# Patient Record
Sex: FEMALE | Race: AMERICAN INDIAN OR ALASKA NATIVE | Employment: UNEMPLOYED | URBAN - METROPOLITAN AREA
[De-identification: names, ages, dates, MRNs, and addresses within clinical notes are randomized per-mention and may not be internally consistent; named-entity substitution may affect disease eponyms.]

---

## 2021-04-25 ENCOUNTER — APPOINTMENT (OUTPATIENT)
Dept: CT IMAGING | Age: 71
DRG: 065 | End: 2021-04-25
Payer: MEDICARE

## 2021-04-25 ENCOUNTER — APPOINTMENT (OUTPATIENT)
Dept: GENERAL RADIOLOGY | Age: 71
DRG: 065 | End: 2021-04-25
Payer: MEDICARE

## 2021-04-25 ENCOUNTER — HOSPITAL ENCOUNTER (INPATIENT)
Age: 71
LOS: 3 days | Discharge: NURSING, REHAB OR LONG-TERM CARE | DRG: 065 | End: 2021-04-29
Attending: EMERGENCY MEDICINE | Admitting: INTERNAL MEDICINE
Payer: MEDICARE

## 2021-04-25 DIAGNOSIS — I63.9 ACUTE CVA (CEREBROVASCULAR ACCIDENT) (HCC): ICD-10-CM

## 2021-04-25 DIAGNOSIS — R11.2 NON-INTRACTABLE VOMITING WITH NAUSEA, UNSPECIFIED VOMITING TYPE: Primary | ICD-10-CM

## 2021-04-25 DIAGNOSIS — J32.0 CHRONIC MAXILLARY SINUSITIS: ICD-10-CM

## 2021-04-25 PROBLEM — R42 DIZZINESS: Status: ACTIVE | Noted: 2021-04-25

## 2021-04-25 LAB
-: ABNORMAL
ABSOLUTE EOS #: 0.4 K/UL (ref 0–0.4)
ABSOLUTE IMMATURE GRANULOCYTE: ABNORMAL K/UL (ref 0–0.3)
ABSOLUTE LYMPH #: 3.3 K/UL (ref 1–4.8)
ABSOLUTE MONO #: 0.5 K/UL (ref 0.1–1.2)
ALBUMIN SERPL-MCNC: 3.3 G/DL (ref 3.5–5.2)
ALBUMIN/GLOBULIN RATIO: 1.1 (ref 1–2.5)
ALP BLD-CCNC: 65 U/L (ref 35–104)
ALT SERPL-CCNC: 12 U/L (ref 5–33)
AMORPHOUS: ABNORMAL
AMYLASE: 70 U/L (ref 28–100)
ANION GAP SERPL CALCULATED.3IONS-SCNC: 12 MMOL/L (ref 9–17)
AST SERPL-CCNC: 16 U/L
BACTERIA: ABNORMAL
BASOPHILS # BLD: 1 % (ref 0–2)
BASOPHILS ABSOLUTE: 0.1 K/UL (ref 0–0.2)
BILIRUB SERPL-MCNC: 0.4 MG/DL (ref 0.3–1.2)
BILIRUBIN URINE: NEGATIVE
BNP INTERPRETATION: NORMAL
BUN BLDV-MCNC: 17 MG/DL (ref 8–23)
BUN/CREAT BLD: ABNORMAL (ref 9–20)
C-REACTIVE PROTEIN: <3 MG/L (ref 0–5)
CALCIUM SERPL-MCNC: 9.3 MG/DL (ref 8.6–10.4)
CASTS UA: ABNORMAL /LPF
CASTS UA: ABNORMAL /LPF
CHLORIDE BLD-SCNC: 102 MMOL/L (ref 98–107)
CO2: 25 MMOL/L (ref 20–31)
COLOR: YELLOW
COMMENT UA: ABNORMAL
CREAT SERPL-MCNC: 0.58 MG/DL (ref 0.5–0.9)
CRYSTALS, UA: ABNORMAL /HPF
D-DIMER QUANTITATIVE: 0.42 MG/L FEU
DIFFERENTIAL TYPE: ABNORMAL
EOSINOPHILS RELATIVE PERCENT: 5 % (ref 1–4)
EPITHELIAL CELLS UA: ABNORMAL /HPF (ref 0–5)
GFR AFRICAN AMERICAN: >60 ML/MIN
GFR NON-AFRICAN AMERICAN: >60 ML/MIN
GFR SERPL CREATININE-BSD FRML MDRD: ABNORMAL ML/MIN/{1.73_M2}
GFR SERPL CREATININE-BSD FRML MDRD: ABNORMAL ML/MIN/{1.73_M2}
GLUCOSE BLD-MCNC: 134 MG/DL (ref 65–105)
GLUCOSE BLD-MCNC: 173 MG/DL (ref 70–99)
GLUCOSE URINE: NEGATIVE
HCT VFR BLD CALC: 45.7 % (ref 36–46)
HEMOGLOBIN: 14.9 G/DL (ref 12–16)
IMMATURE GRANULOCYTES: ABNORMAL %
KETONES, URINE: NEGATIVE
LACTIC ACID, SEPSIS WHOLE BLOOD: ABNORMAL MMOL/L (ref 0.5–1.9)
LACTIC ACID, SEPSIS WHOLE BLOOD: NORMAL MMOL/L (ref 0.5–1.9)
LACTIC ACID, SEPSIS: 1.5 MMOL/L (ref 0.5–1.9)
LACTIC ACID, SEPSIS: 2.3 MMOL/L (ref 0.5–1.9)
LEUKOCYTE ESTERASE, URINE: NEGATIVE
LIPASE: 42 U/L (ref 13–60)
LYMPHOCYTES # BLD: 39 % (ref 24–44)
MCH RBC QN AUTO: 28.7 PG (ref 26–34)
MCHC RBC AUTO-ENTMCNC: 32.6 G/DL (ref 31–37)
MCV RBC AUTO: 88 FL (ref 80–100)
MONOCYTES # BLD: 6 % (ref 2–11)
MUCUS: ABNORMAL
NITRITE, URINE: NEGATIVE
NRBC AUTOMATED: ABNORMAL PER 100 WBC
OTHER OBSERVATIONS UA: ABNORMAL
PDW BLD-RTO: 13.3 % (ref 12.5–15.4)
PH UA: 7 (ref 5–8)
PLATELET # BLD: 256 K/UL (ref 140–450)
PLATELET ESTIMATE: ABNORMAL
PMV BLD AUTO: 9.6 FL (ref 6–12)
POTASSIUM SERPL-SCNC: 3.7 MMOL/L (ref 3.7–5.3)
PRO-BNP: 33 PG/ML
PROTEIN UA: ABNORMAL
RBC # BLD: 5.2 M/UL (ref 4–5.2)
RBC # BLD: ABNORMAL 10*6/UL
RBC UA: ABNORMAL /HPF (ref 0–2)
RENAL EPITHELIAL, UA: ABNORMAL /HPF
SARS-COV-2, RAPID: NOT DETECTED
SEDIMENTATION RATE, ERYTHROCYTE: 35 MM (ref 0–30)
SEG NEUTROPHILS: 49 % (ref 36–66)
SEGMENTED NEUTROPHILS ABSOLUTE COUNT: 4.2 K/UL (ref 1.8–7.7)
SODIUM BLD-SCNC: 139 MMOL/L (ref 135–144)
SPECIFIC GRAVITY UA: 1.02 (ref 1–1.03)
SPECIMEN DESCRIPTION: NORMAL
TOTAL PROTEIN: 6.4 G/DL (ref 6.4–8.3)
TRICHOMONAS: ABNORMAL
TROPONIN INTERP: NORMAL
TROPONIN INTERP: NORMAL
TROPONIN T: NORMAL NG/ML
TROPONIN T: NORMAL NG/ML
TROPONIN, HIGH SENSITIVITY: 6 NG/L (ref 0–14)
TROPONIN, HIGH SENSITIVITY: <6 NG/L (ref 0–14)
TURBIDITY: CLEAR
URINE HGB: ABNORMAL
UROBILINOGEN, URINE: NORMAL
WBC # BLD: 8.4 K/UL (ref 3.5–11)
WBC # BLD: ABNORMAL 10*3/UL
WBC UA: ABNORMAL /HPF (ref 0–5)
YEAST: ABNORMAL

## 2021-04-25 PROCEDURE — 83605 ASSAY OF LACTIC ACID: CPT

## 2021-04-25 PROCEDURE — 2580000003 HC RX 258: Performed by: EMERGENCY MEDICINE

## 2021-04-25 PROCEDURE — 86140 C-REACTIVE PROTEIN: CPT

## 2021-04-25 PROCEDURE — 99285 EMERGENCY DEPT VISIT HI MDM: CPT

## 2021-04-25 PROCEDURE — 70450 CT HEAD/BRAIN W/O DYE: CPT

## 2021-04-25 PROCEDURE — 85025 COMPLETE CBC W/AUTO DIFF WBC: CPT

## 2021-04-25 PROCEDURE — 96365 THER/PROPH/DIAG IV INF INIT: CPT

## 2021-04-25 PROCEDURE — 84484 ASSAY OF TROPONIN QUANT: CPT

## 2021-04-25 PROCEDURE — 87635 SARS-COV-2 COVID-19 AMP PRB: CPT

## 2021-04-25 PROCEDURE — 6360000002 HC RX W HCPCS

## 2021-04-25 PROCEDURE — 6360000002 HC RX W HCPCS: Performed by: EMERGENCY MEDICINE

## 2021-04-25 PROCEDURE — 36415 COLL VENOUS BLD VENIPUNCTURE: CPT

## 2021-04-25 PROCEDURE — 71045 X-RAY EXAM CHEST 1 VIEW: CPT

## 2021-04-25 PROCEDURE — 85652 RBC SED RATE AUTOMATED: CPT

## 2021-04-25 PROCEDURE — 85379 FIBRIN DEGRADATION QUANT: CPT

## 2021-04-25 PROCEDURE — 83690 ASSAY OF LIPASE: CPT

## 2021-04-25 PROCEDURE — 70496 CT ANGIOGRAPHY HEAD: CPT

## 2021-04-25 PROCEDURE — 80053 COMPREHEN METABOLIC PANEL: CPT

## 2021-04-25 PROCEDURE — 81001 URINALYSIS AUTO W/SCOPE: CPT

## 2021-04-25 PROCEDURE — 82150 ASSAY OF AMYLASE: CPT

## 2021-04-25 PROCEDURE — 82947 ASSAY GLUCOSE BLOOD QUANT: CPT

## 2021-04-25 PROCEDURE — 83880 ASSAY OF NATRIURETIC PEPTIDE: CPT

## 2021-04-25 PROCEDURE — 6360000004 HC RX CONTRAST MEDICATION: Performed by: EMERGENCY MEDICINE

## 2021-04-25 PROCEDURE — 96375 TX/PRO/DX INJ NEW DRUG ADDON: CPT

## 2021-04-25 PROCEDURE — 93005 ELECTROCARDIOGRAM TRACING: CPT | Performed by: EMERGENCY MEDICINE

## 2021-04-25 RX ORDER — ONDANSETRON 2 MG/ML
4 INJECTION INTRAMUSCULAR; INTRAVENOUS ONCE
Status: COMPLETED | OUTPATIENT
Start: 2021-04-25 | End: 2021-04-25

## 2021-04-25 RX ORDER — 0.9 % SODIUM CHLORIDE 0.9 %
100 INTRAVENOUS SOLUTION INTRAVENOUS ONCE
Status: COMPLETED | OUTPATIENT
Start: 2021-04-25 | End: 2021-04-25

## 2021-04-25 RX ORDER — ONDANSETRON 2 MG/ML
INJECTION INTRAMUSCULAR; INTRAVENOUS
Status: COMPLETED
Start: 2021-04-25 | End: 2021-04-25

## 2021-04-25 RX ORDER — SODIUM CHLORIDE 9 MG/ML
1000 INJECTION, SOLUTION INTRAVENOUS CONTINUOUS
Status: DISCONTINUED | OUTPATIENT
Start: 2021-04-25 | End: 2021-04-26

## 2021-04-25 RX ORDER — SODIUM CHLORIDE 0.9 % (FLUSH) 0.9 %
10 SYRINGE (ML) INJECTION PRN
Status: DISCONTINUED | OUTPATIENT
Start: 2021-04-25 | End: 2021-04-29 | Stop reason: HOSPADM

## 2021-04-25 RX ORDER — 0.9 % SODIUM CHLORIDE 0.9 %
30 INTRAVENOUS SOLUTION INTRAVENOUS ONCE
Status: COMPLETED | OUTPATIENT
Start: 2021-04-25 | End: 2021-04-25

## 2021-04-25 RX ADMIN — SODIUM CHLORIDE 1000 ML: 9 INJECTION, SOLUTION INTRAVENOUS at 20:52

## 2021-04-25 RX ADMIN — ONDANSETRON 4 MG: 2 INJECTION INTRAMUSCULAR; INTRAVENOUS at 21:40

## 2021-04-25 RX ADMIN — SODIUM CHLORIDE 1000 ML: 9 INJECTION, SOLUTION INTRAVENOUS at 21:10

## 2021-04-25 RX ADMIN — SODIUM CHLORIDE, PRESERVATIVE FREE 10 ML: 5 INJECTION INTRAVENOUS at 22:31

## 2021-04-25 RX ADMIN — SODIUM CHLORIDE 100 ML: 9 INJECTION, SOLUTION INTRAVENOUS at 22:30

## 2021-04-25 RX ADMIN — ONDANSETRON 4 MG: 2 INJECTION INTRAMUSCULAR; INTRAVENOUS at 22:49

## 2021-04-25 RX ADMIN — IOPAMIDOL 75 ML: 755 INJECTION, SOLUTION INTRAVENOUS at 22:31

## 2021-04-25 SDOH — HEALTH STABILITY: MENTAL HEALTH: HOW OFTEN DO YOU HAVE A DRINK CONTAINING ALCOHOL?: NEVER

## 2021-04-25 ASSESSMENT — ENCOUNTER SYMPTOMS
COLOR CHANGE: 0
SHORTNESS OF BREATH: 0
VOMITING: 1
EYE DISCHARGE: 0
EYE REDNESS: 0
CONSTIPATION: 0
EYE PAIN: 0
DIARRHEA: 0
WHEEZING: 0
STRIDOR: 0
ABDOMINAL PAIN: 0
COUGH: 0
SORE THROAT: 0
NAUSEA: 1

## 2021-04-26 ENCOUNTER — APPOINTMENT (OUTPATIENT)
Dept: GENERAL RADIOLOGY | Age: 71
DRG: 065 | End: 2021-04-26
Payer: MEDICARE

## 2021-04-26 ENCOUNTER — APPOINTMENT (OUTPATIENT)
Dept: MRI IMAGING | Age: 71
DRG: 065 | End: 2021-04-26
Payer: MEDICARE

## 2021-04-26 PROBLEM — G43.009 MIGRAINE WITHOUT AURA AND WITHOUT STATUS MIGRAINOSUS, NOT INTRACTABLE: Status: ACTIVE | Noted: 2020-12-23

## 2021-04-26 PROBLEM — G47.30 SLEEP APNEA: Chronic | Status: ACTIVE | Noted: 2020-12-23

## 2021-04-26 PROBLEM — E78.00 HYPERCHOLESTEROLEMIA: Status: ACTIVE | Noted: 2021-04-26

## 2021-04-26 PROBLEM — I10 HYPERTENSION: Status: ACTIVE | Noted: 2020-12-23

## 2021-04-26 PROBLEM — I63.9 ACUTE CVA (CEREBROVASCULAR ACCIDENT) (HCC): Status: ACTIVE | Noted: 2021-04-26

## 2021-04-26 PROBLEM — M19.90 OSTEOARTHRITIS: Status: ACTIVE | Noted: 2021-04-26

## 2021-04-26 PROBLEM — G43.009 MIGRAINE WITHOUT AURA AND WITHOUT STATUS MIGRAINOSUS, NOT INTRACTABLE: Chronic | Status: ACTIVE | Noted: 2020-12-23

## 2021-04-26 PROBLEM — G47.30 SLEEP APNEA: Status: ACTIVE | Noted: 2020-12-23

## 2021-04-26 PROBLEM — M19.90 OSTEOARTHRITIS: Chronic | Status: ACTIVE | Noted: 2021-04-26

## 2021-04-26 LAB
CHOLESTEROL/HDL RATIO: 3.9
CHOLESTEROL: 258 MG/DL
FOLATE: 9 NG/ML
GLUCOSE BLD-MCNC: 120 MG/DL (ref 65–105)
HCT VFR BLD CALC: 40.9 % (ref 36–46)
HDLC SERPL-MCNC: 67 MG/DL
HEMOGLOBIN: 13.1 G/DL (ref 12–16)
LDL CHOLESTEROL: 162 MG/DL (ref 0–130)
MCH RBC QN AUTO: 28.5 PG (ref 26–34)
MCHC RBC AUTO-ENTMCNC: 32 G/DL (ref 31–37)
MCV RBC AUTO: 88.9 FL (ref 80–100)
NRBC AUTOMATED: NORMAL PER 100 WBC
PDW BLD-RTO: 13.5 % (ref 12.5–15.4)
PLATELET # BLD: 240 K/UL (ref 140–450)
PMV BLD AUTO: 9.5 FL (ref 6–12)
RBC # BLD: 4.6 M/UL (ref 4–5.2)
TRIGL SERPL-MCNC: 143 MG/DL
TROPONIN INTERP: NORMAL
TROPONIN INTERP: NORMAL
TROPONIN T: NORMAL NG/ML
TROPONIN T: NORMAL NG/ML
TROPONIN, HIGH SENSITIVITY: 6 NG/L (ref 0–14)
TROPONIN, HIGH SENSITIVITY: <6 NG/L (ref 0–14)
TSH SERPL DL<=0.05 MIU/L-ACNC: 1.45 MIU/L (ref 0.3–5)
VITAMIN B-12: 341 PG/ML (ref 232–1245)
VITAMIN D 25-HYDROXY: 14.9 NG/ML (ref 30–100)
VLDLC SERPL CALC-MCNC: ABNORMAL MG/DL (ref 1–30)
WBC # BLD: 8.8 K/UL (ref 3.5–11)

## 2021-04-26 PROCEDURE — 82306 VITAMIN D 25 HYDROXY: CPT

## 2021-04-26 PROCEDURE — 97166 OT EVAL MOD COMPLEX 45 MIN: CPT

## 2021-04-26 PROCEDURE — 99223 1ST HOSP IP/OBS HIGH 75: CPT | Performed by: INTERNAL MEDICINE

## 2021-04-26 PROCEDURE — 6360000002 HC RX W HCPCS: Performed by: NURSE PRACTITIONER

## 2021-04-26 PROCEDURE — 82947 ASSAY GLUCOSE BLOOD QUANT: CPT

## 2021-04-26 PROCEDURE — 97162 PT EVAL MOD COMPLEX 30 MIN: CPT

## 2021-04-26 PROCEDURE — 2580000003 HC RX 258: Performed by: NURSE PRACTITIONER

## 2021-04-26 PROCEDURE — 84484 ASSAY OF TROPONIN QUANT: CPT

## 2021-04-26 PROCEDURE — 85027 COMPLETE CBC AUTOMATED: CPT

## 2021-04-26 PROCEDURE — 70551 MRI BRAIN STEM W/O DYE: CPT

## 2021-04-26 PROCEDURE — 82746 ASSAY OF FOLIC ACID SERUM: CPT

## 2021-04-26 PROCEDURE — 82607 VITAMIN B-12: CPT

## 2021-04-26 PROCEDURE — 2700000000 HC OXYGEN THERAPY PER DAY

## 2021-04-26 PROCEDURE — 97116 GAIT TRAINING THERAPY: CPT

## 2021-04-26 PROCEDURE — 80061 LIPID PANEL: CPT

## 2021-04-26 PROCEDURE — 2580000003 HC RX 258: Performed by: EMERGENCY MEDICINE

## 2021-04-26 PROCEDURE — 36415 COLL VENOUS BLD VENIPUNCTURE: CPT

## 2021-04-26 PROCEDURE — 6370000000 HC RX 637 (ALT 250 FOR IP): Performed by: NURSE PRACTITIONER

## 2021-04-26 PROCEDURE — 74018 RADEX ABDOMEN 1 VIEW: CPT

## 2021-04-26 PROCEDURE — 99222 1ST HOSP IP/OBS MODERATE 55: CPT | Performed by: PSYCHIATRY & NEUROLOGY

## 2021-04-26 PROCEDURE — 97530 THERAPEUTIC ACTIVITIES: CPT

## 2021-04-26 PROCEDURE — 2500000003 HC RX 250 WO HCPCS: Performed by: NURSE PRACTITIONER

## 2021-04-26 PROCEDURE — 1210000000 HC MED SURG R&B

## 2021-04-26 PROCEDURE — APPSS45 APP SPLIT SHARED TIME 31-45 MINUTES: Performed by: NURSE PRACTITIONER

## 2021-04-26 PROCEDURE — 84443 ASSAY THYROID STIM HORMONE: CPT

## 2021-04-26 PROCEDURE — 97535 SELF CARE MNGMENT TRAINING: CPT

## 2021-04-26 PROCEDURE — 6360000002 HC RX W HCPCS: Performed by: EMERGENCY MEDICINE

## 2021-04-26 PROCEDURE — 94761 N-INVAS EAR/PLS OXIMETRY MLT: CPT

## 2021-04-26 RX ORDER — SODIUM CHLORIDE 0.9 % (FLUSH) 0.9 %
5-40 SYRINGE (ML) INJECTION EVERY 12 HOURS SCHEDULED
Status: DISCONTINUED | OUTPATIENT
Start: 2021-04-26 | End: 2021-04-29 | Stop reason: HOSPADM

## 2021-04-26 RX ORDER — DEXTROSE MONOHYDRATE 25 G/50ML
12.5 INJECTION, SOLUTION INTRAVENOUS PRN
Status: DISCONTINUED | OUTPATIENT
Start: 2021-04-26 | End: 2021-04-29 | Stop reason: HOSPADM

## 2021-04-26 RX ORDER — ACETAMINOPHEN 650 MG/1
650 SUPPOSITORY RECTAL EVERY 6 HOURS PRN
Status: DISCONTINUED | OUTPATIENT
Start: 2021-04-26 | End: 2021-04-29 | Stop reason: HOSPADM

## 2021-04-26 RX ORDER — MECLIZINE HCL 12.5 MG/1
25 TABLET ORAL EVERY 6 HOURS SCHEDULED
Status: DISCONTINUED | OUTPATIENT
Start: 2021-04-26 | End: 2021-04-26

## 2021-04-26 RX ORDER — POLYETHYLENE GLYCOL 3350 17 G/17G
17 POWDER, FOR SOLUTION ORAL DAILY PRN
Status: DISCONTINUED | OUTPATIENT
Start: 2021-04-26 | End: 2021-04-29 | Stop reason: HOSPADM

## 2021-04-26 RX ORDER — MECLIZINE HCL 12.5 MG/1
25 TABLET ORAL 3 TIMES DAILY PRN
Status: DISCONTINUED | OUTPATIENT
Start: 2021-04-26 | End: 2021-04-26

## 2021-04-26 RX ORDER — NICOTINE POLACRILEX 4 MG
15 LOZENGE BUCCAL PRN
Status: DISCONTINUED | OUTPATIENT
Start: 2021-04-26 | End: 2021-04-29 | Stop reason: HOSPADM

## 2021-04-26 RX ORDER — PROMETHAZINE HYDROCHLORIDE 25 MG/ML
12.5 INJECTION, SOLUTION INTRAMUSCULAR; INTRAVENOUS ONCE
Status: COMPLETED | OUTPATIENT
Start: 2021-04-26 | End: 2021-04-26

## 2021-04-26 RX ORDER — ASPIRIN 81 MG/1
81 TABLET, CHEWABLE ORAL DAILY
Status: DISCONTINUED | OUTPATIENT
Start: 2021-04-26 | End: 2021-04-29 | Stop reason: HOSPADM

## 2021-04-26 RX ORDER — SODIUM CHLORIDE 9 MG/ML
25 INJECTION, SOLUTION INTRAVENOUS PRN
Status: DISCONTINUED | OUTPATIENT
Start: 2021-04-26 | End: 2021-04-29 | Stop reason: HOSPADM

## 2021-04-26 RX ORDER — DEXTROSE MONOHYDRATE 50 MG/ML
100 INJECTION, SOLUTION INTRAVENOUS PRN
Status: DISCONTINUED | OUTPATIENT
Start: 2021-04-26 | End: 2021-04-29 | Stop reason: HOSPADM

## 2021-04-26 RX ORDER — METOPROLOL TARTRATE 5 MG/5ML
2.5 INJECTION INTRAVENOUS 4 TIMES DAILY PRN
Status: DISCONTINUED | OUTPATIENT
Start: 2021-04-26 | End: 2021-04-29 | Stop reason: HOSPADM

## 2021-04-26 RX ORDER — PROMETHAZINE HYDROCHLORIDE 25 MG/ML
25 INJECTION, SOLUTION INTRAMUSCULAR; INTRAVENOUS EVERY 6 HOURS PRN
Status: DISCONTINUED | OUTPATIENT
Start: 2021-04-26 | End: 2021-04-26

## 2021-04-26 RX ORDER — METOPROLOL TARTRATE 5 MG/5ML
2.5 INJECTION INTRAVENOUS EVERY 6 HOURS
Status: DISCONTINUED | OUTPATIENT
Start: 2021-04-26 | End: 2021-04-26

## 2021-04-26 RX ORDER — ATORVASTATIN CALCIUM 10 MG/1
20 TABLET, FILM COATED ORAL NIGHTLY
Status: DISCONTINUED | OUTPATIENT
Start: 2021-04-26 | End: 2021-04-29 | Stop reason: HOSPADM

## 2021-04-26 RX ORDER — SODIUM CHLORIDE 0.9 % (FLUSH) 0.9 %
5-40 SYRINGE (ML) INJECTION PRN
Status: DISCONTINUED | OUTPATIENT
Start: 2021-04-26 | End: 2021-04-29 | Stop reason: HOSPADM

## 2021-04-26 RX ORDER — PROMETHAZINE HYDROCHLORIDE 25 MG/1
12.5 TABLET ORAL EVERY 6 HOURS PRN
Status: DISCONTINUED | OUTPATIENT
Start: 2021-04-26 | End: 2021-04-29 | Stop reason: HOSPADM

## 2021-04-26 RX ORDER — METOCLOPRAMIDE HYDROCHLORIDE 5 MG/ML
10 INJECTION INTRAMUSCULAR; INTRAVENOUS EVERY 6 HOURS
Status: DISCONTINUED | OUTPATIENT
Start: 2021-04-26 | End: 2021-04-29

## 2021-04-26 RX ORDER — MECLIZINE HCL 12.5 MG/1
25 TABLET ORAL 3 TIMES DAILY PRN
Status: DISCONTINUED | OUTPATIENT
Start: 2021-04-26 | End: 2021-04-29 | Stop reason: HOSPADM

## 2021-04-26 RX ORDER — ATORVASTATIN CALCIUM 40 MG/1
40 TABLET, FILM COATED ORAL NIGHTLY
Status: DISCONTINUED | OUTPATIENT
Start: 2021-04-26 | End: 2021-04-26

## 2021-04-26 RX ORDER — METOPROLOL TARTRATE 5 MG/5ML
5 INJECTION INTRAVENOUS EVERY 4 HOURS PRN
Status: DISCONTINUED | OUTPATIENT
Start: 2021-04-26 | End: 2021-04-26

## 2021-04-26 RX ORDER — ONDANSETRON 2 MG/ML
4 INJECTION INTRAMUSCULAR; INTRAVENOUS EVERY 6 HOURS PRN
Status: DISCONTINUED | OUTPATIENT
Start: 2021-04-26 | End: 2021-04-29 | Stop reason: HOSPADM

## 2021-04-26 RX ORDER — CLOPIDOGREL BISULFATE 75 MG/1
75 TABLET ORAL DAILY
Status: DISCONTINUED | OUTPATIENT
Start: 2021-04-26 | End: 2021-04-29 | Stop reason: HOSPADM

## 2021-04-26 RX ORDER — ACETAMINOPHEN 325 MG/1
650 TABLET ORAL EVERY 6 HOURS PRN
Status: DISCONTINUED | OUTPATIENT
Start: 2021-04-26 | End: 2021-04-29 | Stop reason: HOSPADM

## 2021-04-26 RX ORDER — SODIUM CHLORIDE 9 MG/ML
INJECTION, SOLUTION INTRAVENOUS CONTINUOUS
Status: DISCONTINUED | OUTPATIENT
Start: 2021-04-26 | End: 2021-04-28

## 2021-04-26 RX ORDER — PROCHLORPERAZINE EDISYLATE 5 MG/ML
10 INJECTION INTRAMUSCULAR; INTRAVENOUS EVERY 6 HOURS PRN
Status: DISCONTINUED | OUTPATIENT
Start: 2021-04-26 | End: 2021-04-29 | Stop reason: HOSPADM

## 2021-04-26 RX ADMIN — ASPIRIN 81 MG: 81 TABLET, CHEWABLE ORAL at 12:08

## 2021-04-26 RX ADMIN — SODIUM CHLORIDE: 9 INJECTION, SOLUTION INTRAVENOUS at 09:00

## 2021-04-26 RX ADMIN — PROCHLORPERAZINE EDISYLATE 10 MG: 5 INJECTION INTRAMUSCULAR; INTRAVENOUS at 15:12

## 2021-04-26 RX ADMIN — PROMETHAZINE HYDROCHLORIDE 12.5 MG: 25 INJECTION INTRAMUSCULAR; INTRAVENOUS at 10:11

## 2021-04-26 RX ADMIN — METOCLOPRAMIDE 10 MG: 5 INJECTION, SOLUTION INTRAMUSCULAR; INTRAVENOUS at 21:05

## 2021-04-26 RX ADMIN — CEFTRIAXONE SODIUM 1000 MG: 1 INJECTION, POWDER, FOR SOLUTION INTRAMUSCULAR; INTRAVENOUS at 00:10

## 2021-04-26 RX ADMIN — ONDANSETRON 4 MG: 2 INJECTION INTRAMUSCULAR; INTRAVENOUS at 09:25

## 2021-04-26 RX ADMIN — PROMETHAZINE HYDROCHLORIDE 12.5 MG: 25 INJECTION INTRAMUSCULAR; INTRAVENOUS at 12:43

## 2021-04-26 RX ADMIN — ENOXAPARIN SODIUM 40 MG: 40 INJECTION SUBCUTANEOUS at 09:25

## 2021-04-26 RX ADMIN — ONDANSETRON 4 MG: 2 INJECTION INTRAMUSCULAR; INTRAVENOUS at 04:21

## 2021-04-26 RX ADMIN — FAMOTIDINE 20 MG: 10 INJECTION, SOLUTION INTRAVENOUS at 12:08

## 2021-04-26 RX ADMIN — DESMOPRESSIN ACETATE 40 MG: 0.2 TABLET ORAL at 01:40

## 2021-04-26 ASSESSMENT — ENCOUNTER SYMPTOMS
COUGH: 0
STRIDOR: 0
DIARRHEA: 0
WHEEZING: 0
NAUSEA: 1
EYES NEGATIVE: 1
VOMITING: 1
BLOOD IN STOOL: 0
ABDOMINAL PAIN: 0
CONSTIPATION: 0
SHORTNESS OF BREATH: 0

## 2021-04-26 ASSESSMENT — PAIN SCALES - GENERAL
PAINLEVEL_OUTOF10: 0

## 2021-04-26 NOTE — PLAN OF CARE
Problem: Infection:  Goal: Will remain free from infection  Description: Will remain free from infection  4/26/2021 0957 by Wyatt Mims RN  Outcome: Ongoing  4/26/2021 0318 by Roberto Carlos Maurice RN  Outcome: Ongoing     Problem: Safety:  Goal: Free from accidental physical injury  Description: Free from accidental physical injury  4/26/2021 0957 by Wyatt Mims RN  Outcome: Ongoing  4/26/2021 0318 by Roberto Carlos Maurice RN  Outcome: Ongoing  Goal: Free from intentional harm  Description: Free from intentional harm  4/26/2021 0957 by Wyatt Mims RN  Outcome: Ongoing  4/26/2021 0318 by Roberto Carlos Maurice RN  Outcome: Ongoing     Problem: Daily Care:  Goal: Daily care needs are met  Description: Daily care needs are met  4/26/2021 0957 by Wyatt Mims RN  Outcome: Ongoing  4/26/2021 0318 by Roberto Carlos Maurice RN  Outcome: Ongoing   Pain level assessment complete.    Patient educated on pain scale and control interventions  PRN pain medication given per patient request  Patient instructed to call out with new onset of pain or unrelieved pain   Problem: Skin Integrity:  Goal: Skin integrity will stabilize  Description: Skin integrity will stabilize  4/26/2021 0957 by Wyatt Mims RN  Outcome: Ongoing  4/26/2021 0318 by Roberto Carlos Maurice RN  Outcome: Ongoing     Problem: Discharge Planning:  Goal: Patients continuum of care needs are met  Description: Patients continuum of care needs are met  4/26/2021 0957 by Wyatt Mims RN  Outcome: Ongoing  4/26/2021 0318 by Roberto Carlos Maurice RN  Outcome: Ongoing     Problem: Cardiac:  Goal: Ability to maintain vital signs within normal range will improve  Description: Ability to maintain vital signs within normal range will improve  Outcome: Ongoing  Goal: Cardiovascular alteration will improve  Description: Cardiovascular alteration will improve  Outcome: Ongoing     Problem: Health Behavior:  Goal: Will modify at least one risk factor affecting health status  Description: Will modify at least one risk factor affecting health status  Outcome: Ongoing  Goal: Identification of resources available to assist in meeting health care needs will improve  Description: Identification of resources available to assist in meeting health care needs will improve  Outcome: Ongoing     Problem: Physical Regulation:  Goal: Complications related to the disease process, condition or treatment will be avoided or minimized  Description: Complications related to the disease process, condition or treatment will be avoided or minimized  Outcome: Ongoing   Siderails up x 2  Hourly rounding  Call light in reach  Instructed to call for assist before attempting out of bed.   Remains free from falls and accidental injury at this time   Floor free from obstacles  Bed is locked and in lowest position  Adequate lighting provided  Bed alarm on, Red Falling star and Stay with Me signs posted     Problem: Falls - Risk of:  Goal: Will remain free from falls  Description: Will remain free from falls  Outcome: Ongoing  Goal: Absence of physical injury  Description: Absence of physical injury  Outcome: Ongoing     Problem: Pain:  Goal: Patient's pain/discomfort is manageable  Description: Patient's pain/discomfort is manageable  4/26/2021 0957 by Atiya Ayala RN  Outcome: Ongoing  4/26/2021 0318 by Terry Cerrato RN  Outcome: Ongoing

## 2021-04-26 NOTE — PROGRESS NOTES
Patient has been nauseous with movement throughout the shift and she has received multiple doses of several anti-emetics. Due to the continued nausea she has refused all PO medications.

## 2021-04-26 NOTE — CARE COORDINATION
Case Management Initial Discharge Plan  [place],             Met with:patient spouse and son  to discuss discharge plans. Information verified: address, contacts, phone number, , insurance Yes    Emergency Contact/Next of Kin name & number: Jaskaran Motta [phone]    PCP: Fe Cedillo DO  Date of last visit: past few months     Insurance Provider: Medicare    Discharge Planning    Living Arrangements:  Spouse/Significant Other   Support Systems:  Spouse/Significant Other, Children    Patient able to perform ADL's:Independent    Current Services (outpatient & in home) none  DME equipment: Cpap  DME provider:     Receiving oral anticoagulation therapy? No    If indicated:   Physician managing anticoagulation treatment:   Where does patient obtain lab work for ATC treatment? Potential Assistance Needed:  N/A    Patient agreeable to home care: No  Waves of choice provided:  no    Prior SNF/Rehab Placement and Facility: no  Agreeable to SNF/Rehab: No  Waves of choice provided: no     Evaluation: no    Expected Discharge date:  21    Patient expects to be discharged to:  home  Follow Up Appointment: Best Day/ Time: Monday AM    Transportation provider: family  Transportation arrangements needed for discharge: No    Readmission Risk              Risk of Unplanned Readmission:        0           Does patient have a readmission risk score greater than 14?: No  If yes, follow-up appointment must be made within 7 days of discharge.      Goals of Care: testing for new dizziness      Discharge Plan: home with spouse, independent          Electronically signed by Ave Beckford RN on 21 at 10:57 AM EDT

## 2021-04-26 NOTE — PROGRESS NOTES
Physical Therapy    Facility/Department: [facility]  Initial Assessment    NAME: Scott Roberson  : 1950  MRN: [id number]    Date of Service: 2021  Chief Complaint   Patient presents with    Emesis     onset at 1700    Dizziness   Pt presented with dizziness/nausea/vomitting and found on MRI to have a area of ischemia to left cerebellar hemisphere. Discharge Recommendations:  Patient would benefit from continued therapy after discharge   PT Equipment Recommendations  Equipment Needed: (Continue to assess- pt may benefit from RW pending her overall improvements in balance/mobility at discharge.)    Assessment   Body structures, Functions, Activity limitations: Decreased functional mobility ; Decreased strength;Decreased safe awareness;Decreased endurance;Decreased balance;Decreased coordination  Assessment: Pt is most limited this date by her impaired balance, nausea and overall dizziness. Pt with unsteady gait and multiple small LOBs requiring min A to recover. Pt would be unsafe to ambulate without physical assistance at this time- including with use of RW secondary to her unsteadiness and multiple LOBs. Pt would be unsafe to return to her prior living arrangements without physical assistance at all times. Pt will benefit from high intensity rehab with a focus on balance, gait, transfer and safety training to improve her overall safety and independence with functional mobility to allow for a return to her high PLOF. Prognosis: Good  Decision Making: Medium Complexity  PT Education: Goals;PT Role;Plan of Care;Transfer Training;Functional Mobility Training;Gait Training;General Safety; Family Education  REQUIRES PT FOLLOW UP: Yes  Activity Tolerance  Activity Tolerance: Patient limited by endurance; Patient limited by fatigue  Activity Tolerance: Limited by nausea/dizziness       Patient Diagnosis(es): The primary encounter diagnosis was Non-intractable vomiting with nausea, unspecified vomiting type. A diagnosis of Chronic maxillary sinusitis was also pertinent to this visit. has a past medical history of Hypertension, Nephropathy, and ANTIONETTE on CPAP. has a past surgical history that includes  section. Restrictions  Restrictions/Precautions  Restrictions/Precautions: Fall Risk  Required Braces or Orthoses?: No  Position Activity Restriction  Other position/activity restrictions: up with assistance - 2L O2 via nasal cannula while resting only per respiratory therapy  Vision/Hearing  Vision: Impaired  Vision Exceptions: Wears glasses at all times  Hearing: Within functional limits     Subjective  General  Patient assessed for rehabilitation services?: Yes  Response To Previous Treatment: Not applicable  Family / Caregiver Present: Yes(son and  presented at end of session)  Follows Commands: Within Functional Limits  Subjective  Subjective: Pt supine in bed and agreeable to therapy with encouragement. RN agreeable to therapy. Pt denies any pain but notes nausea/dizziness with mobility.   Pain Screening  Patient Currently in Pain: Denies  Vital Signs  Patient Currently in Pain: Denies       Orientation  Orientation  Overall Orientation Status: Within Functional Limits  Social/Functional History  Social/Functional History  Lives With: Spouse  Type of Home: House  Home Layout: Two level(bedroom on 2nd floor (with ability for 1st floor living) - fulll bathroom on 1st level)  Home Access: Stairs to enter without rails  Entrance Stairs - Number of Steps: 3 from front - 2 from back  Bathroom Shower/Tub: Walk-in shower  Bathroom Toilet: Handicap height  ADL Assistance: Independent  Homemaking Assistance: Independent  Homemaking Responsibilities: Yes  Meal Prep Responsibility: Primary  Laundry Responsibility: Primary  Cleaning Responsibility: Primary  Shopping Responsibility: Secondary  Ambulation Assistance: Independent  Transfer Assistance: Independent  Active : No  Patient's  Info:  drives  Occupation: Retired  Type of occupation: Worked for [address]: Reading and gardening  Additional Comments: Pt's  is retired and in good health for assistance as needed. Pt's children and family live nearby each other, however pt lives out of town. Cognition   Cognition  Overall Cognitive Status: Exceptions  Following Commands: Follows multistep commands with repitition; Follows multistep commands with increased time  Safety Judgement: Decreased awareness of need for assistance;Decreased awareness of need for safety  Problem Solving: Assistance required to identify errors made;Assistance required to generate solutions;Assistance required to implement solutions  Insights: Decreased awareness of deficits  Initiation: Requires cues for some  Sequencing: Requires cues for some  Cognition Comment: Min VCs required for mild impulsivity prior to sit<>stand; Pt required increased time/processing of directioining of functional tasks;  Redirection/education provided with safety awareness w/RW    Objective          AROM RLE (degrees)  RLE AROM: WFL  AROM LLE (degrees)  LLE AROM : WFL  AROM RUE (degrees)  RUE AROM : WFL  AROM LUE (degrees)  LUE AROM : WFL  Strength RLE  Strength RLE: Exception  R Hip Flexion: 4+/5  R Knee Flexion: 4+/5  R Knee Extension: 4+/5  R Ankle Dorsiflexion: 4+/5  R Ankle Plantar flexion: 4+/5  Strength LLE  Strength LLE: Exception  L Hip Flexion: 4+/5  L Knee Flexion: 4+/5  L Knee Extension: 4+/5  L Ankle Dorsiflexion: 4+/5  L Ankle Plantar Flexion: 4+/5  Strength RUE  Comment: Co evaluation with OT- see OT evaluation for full UE assessment  Strength LUE  Comment: Co evaluation with OT- see OT evaluation for full UE assessment  Motor Control  Gross Motor?: WFL  Coordination  Rapid Alternating Movements: Normal  Finger to Nose: Dysmetric  Heel to Shin: Normal  Sensation  Overall Sensation Status: WFL(Pt denies any numbness/tingling.)  Bed mobility  Supine to Sit: Contact guard assistance(Increased time and verbal cues for sequencing)  Sit to Supine: (Pt retired to chair at end of session)  Scooting: Stand by assistance(Increased time with verbal cues for sequencing)  Transfers  Sit to Stand: Minimal Assistance  Stand to sit: Minimal Assistance  Comment: unsteady with min A for LOB- mainly LOB occurs to the left  Ambulation  Ambulation?: Yes  More Ambulation?: Yes  Ambulation 1  Surface: level tile  Device: No Device  Assistance: Minimal assistance  Quality of Gait: unsteady with multiple small LOBs to left requiring min A to correct, decreased step length, decreased gait speed, wandering gait  Gait Deviations: Slow Rochelle;Decreased step length  Distance: 12ft  Ambulation 2  Surface - 2: level tile  Device 2: Rolling Walker  Assistance 2: Contact guard assistance;Minimal assistance  Quality of Gait 2: CGA mostly except with one LOB to left requiring min A, improved wandering gait with use of device, decreased step length, decreased gait speed  Gait Deviations: Slow Rochelle;Decreased step length  Distance: 15ft  Stairs/Curb  Stairs?: No     Balance  Posture: Fair  Sitting - Static: Good;-  Sitting - Dynamic: Fair;+  Standing - Static: Fair  Standing - Dynamic: Fair;-  Comments: standing balance assessed without device; fair to fair + dynamic with use of RW  Romberg/Narrow Base of Support  Eyes Open: 30 seconds  Sway: Minimal  Strategy: Step  Eyes Closed: 10 seconds  Sway: Minimal  Strategy: Step        Plan   Plan  Times per week: 5-6x  Times per day: Daily(1-2x per day)  Current Treatment Recommendations: Strengthening, Transfer Training, Balance Training, Functional Mobility Training, Endurance Training, Gait Training, Stair training, Home Exercise Program, Safety Education & Training, Patient/Caregiver Education & Training, Neuromuscular Re-education  Safety Devices  Type of devices: Call light within reach, Gait belt, Nurse notified, Left in chair  Restraints Initially in place: No      AM-PAC Score  AM-PAC Inpatient Mobility Raw Score : 16 (04/26/21 1606)  AM-PAC Inpatient T-Scale Score : 40.78 (04/26/21 1606)  Mobility Inpatient CMS 0-100% Score: 54.16 (04/26/21 1606)  Mobility Inpatient CMS G-Code Modifier : CK (04/26/21 1606)          Goals  Short term goals  Time Frame for Short term goals: 14 visits  Short term goal 1: Pt to ambulate 300ft independently without device to allow for return to prior functional level  Short term goal 2: Pt to sit <> stand transfer independently to allow for return to prior functional level  Short term goal 3: Pt to tolerate 45 minutes worth of therapy for endurance  Short term goal 4: Pt to demonstrate good to good - standing balance to decrease risk of falls  Short term goal 5: Pt to ascend/descend flight of stairs SBA to allow for access to typical bedroom level of home       Therapy Time   Individual Concurrent Group Co-treatment   Time In 1400         Time Out 1442         Minutes 42         Timed Code Treatment Minutes: [address], PT

## 2021-04-26 NOTE — PROGRESS NOTES
Occupational Therapy   Occupational Therapy Initial Assessment  Date: 2021   Patient Name: Jose Gaines  MRN: [de-identified]     : 1950    Date of Service: 2021  Chief Complaint   Patient presents with    Emesis     onset at 1700    Dizziness     Per pt's MRI, pt has acute/subacute ischemia in the left cerebellar hemisphere. Discharge Recommendations:  Patient would benefit from continued therapy after discharge ; Further therapy recommended at discharge. The patient should be able to tolerate at least three hours of therapy per day over 5 days or 15 hours over 7 days. OT Equipment Recommendations  Equipment Needed: Yes  Mobility Devices: ADL Assistive Devices  Walker: Rolling  ADL Assistive Devices: Shower Chair with back    Assessment   Performance deficits / Impairments: Decreased functional mobility ; Decreased safe awareness;Decreased balance;Decreased coordination;Decreased ADL status; Decreased high-level IADLs  Assessment: Pt agreeable for eval this date; pt c/o nausea and dizziness throughout session. Pt would continue to benefit from skilled OT services at this time and following discharge to promote pt's safety awareness, balance, and independence to perform functional mobility/transfers and ADLs. Pt would be unsafe for return to prior living without 24 hr physical assistance and high-level therapy secondary to pt's presented deficits, balance and coordination, following acute/subacute ischemia in left cerebellar hemisphere.    Prognosis: Good  Decision Making: Medium Complexity  OT Education: OT Role;Plan of Care;Transfer Training;Equipment  Patient Education: balance and safety with RW; management of dizziness/nausea onset (remain eyes open - find focal point)  REQUIRES OT FOLLOW UP: Yes  Activity Tolerance  Activity Tolerance: Patient limited by fatigue  Activity Tolerance: limited secondary to dizziness/nausea  Safety Devices  Safety Devices in place: Yes  Type of devices: Call light within reach;Nurse notified; Left in chair  Restraints  Initially in place: No           Patient Diagnosis(es): The primary encounter diagnosis was Non-intractable vomiting with nausea, unspecified vomiting type. A diagnosis of Chronic maxillary sinusitis was also pertinent to this visit. has a past medical history of Hypertension, Nephropathy, and ANTIONETTE on CPAP. has a past surgical history that includes  section. Restrictions  Restrictions/Precautions  Restrictions/Precautions: Fall Risk  Required Braces or Orthoses?: No  Position Activity Restriction  Other position/activity restrictions: up with assistance - 2L O2 via nasal cannula for sleeping    Subjective   General  Patient assessed for rehabilitation services?: Yes  Family / Caregiver Present: No  General Comment  Comments: RN ok'd for eval.  Patient Currently in Pain: Denies  Vital Signs  Patient Currently in Pain: Denies     Social/Functional History  Social/Functional History  Lives With: Spouse  Type of Home: House  Home Layout: Two level(bedroom on 2nd floor (with ability for 1st floor living) - fulll bathroom on 1st level)  Home Access: Stairs to enter without rails  Entrance Stairs - Number of Steps: 3 from front - 2 from back  Bathroom Shower/Tub: Walk-in shower  Bathroom Toilet: Handicap height  ADL Assistance: Independent  Homemaking Assistance: Independent  Homemaking Responsibilities: Yes  Meal Prep Responsibility: Primary  Laundry Responsibility: Primary  Cleaning Responsibility: Primary  Shopping Responsibility: Secondary  Ambulation Assistance: Independent  Transfer Assistance: Independent  Active : No  Patient's  Info:  drives  Occupation: Retired  Type of 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 Pt's children and family live nearby each other, however pt lives out of town. Objective   Vision: Impaired  Vision Exceptions: Wears glasses at all times  Hearing: Within functional limits      Orientation  Overall Orientation Status: Within Functional Limits     Balance  Sitting Balance: Contact guard assistance(CGA seated EOB during functional ROM/MMT)  Standing Balance: Contact guard assistance; Minimal assistance (standing at sink with RW for hand hygiene and to don undergarment/pants - no true LOB observed)  Standing Balance  Comment: Reduced balance and coordination secondary to acute/subacute ischemia to left cerebellar hemisphere    Functional Mobility  Functional - Mobility Device: Rolling Walker  Activity: To/from bathroom  Assist Level: Contact guard assistance; Minimal assistance  Functional Mobility Comments: CGA-min A secondary to reduced balance/coordination; 1x LOB prior to toilet transfer    Toilet Transfers  Toilet - Technique: Ambulating(w/RW)  Equipment Used: Standard toilet  Toilet Transfer: Contact guard assistance;Minimal assistance  Toilet Transfers Comments: upon turning towards toilet, mild LOB observed to pt left side - CGA/Min A for correction    ADL  Feeding: Independent  Grooming: Contact guard assistance(standing at sink with RW for hand hygiene)  UE Bathing: Minimal assistance; Increased time to complete  LE Bathing: Minimal assistance; Increased time to complete  UE Dressing: Contact guard assistance(seated EOB to don gown)  LE Dressing: Contact guard assistance;Minimal assistance; Increased time to complete(standing from toilet to pull undergarment and pants up and around waist)  Toileting: Contact guard assistance;Stand by assistance(SBA-CGA for dynamic seating during perineal hygiene)  Additional Comments: min VCs required for initation/sequencing and safety awareness with RW during ADLs    Tone RUE  RUE Tone: Normotonic  Tone LUE  LUE Tone: Normotonic  Coordination  Movements Are Fluid And Coordinated: No  Coordination and Movement description: Decreased speed;Decreased accuracy; Ataxia  Quality of Movement Other  Comment: Reduced speed/accuracy with fine motor opposition; Reduced balance/coordination secondary to acute/subacute ischemia in the left cerebellar hemisphere. Bed mobility  Supine to Sit: Stand by assistance  Sit to Supine: (Pt up in recliner following session.)  Scooting: Stand by assistance(verbal cueing/directioning required)     Transfers  Sit to stand: Contact guard assistance  Stand to sit: Contact guard assistance  Transfer Comments: verbal cueing and education provided to gain balance prior to further positioning changes (supine>sit>stand); VCs required for safety awareness/utilization of RW     Cognition  Overall Cognitive Status: Exceptions  Following Commands: Follows multistep commands with repitition; Follows multistep commands with increased time  Safety Judgement: Decreased awareness of need for assistance;Decreased awareness of need for safety  Problem Solving: Assistance required to identify errors made;Assistance required to generate solutions;Assistance required to implement solutions  Insights: Decreased awareness of deficits  Initiation: Requires cues for some  Sequencing: Requires cues for some  Cognition Comment: Min VCs required for mild impulsivity prior to sit<>stand; Pt required increased time/processing of directioining of functional tasks;  Redirection/education provided with safety awareness w/RW        Sensation  Overall Sensation Status: WFL(Pt denies any numbness/tingling.)        LUE AROM (degrees)  LUE AROM : WFL  RUE PROM (degrees)  RUE PROM: WFL  LUE Strength  Gross LUE Strength: WFL  LUE Strength Comment: Grossly 4-/5  RUE Strength  Gross RUE Strength: WFL  RUE Strength Comment: Grossly 4-/5                   Plan   Plan  Times per week: 5-6x/week  Times per day: Daily  Current Treatment Recommendations: Safety Education & Training, Balance Training, Patient/Caregiver Education & Training, Self-Care / ADL, Functional Mobility Training, Equipment Evaluation, Education, & procurement, Home Management Training, Neuromuscular Re-education      AM-PAC Score   AM-PAC Inpatient Daily Activity Raw Score: 19 (04/26/21 1539)  AM-PAC Inpatient ADL T-Scale Score : 40.22 (04/26/21 1539)  ADL Inpatient CMS 0-100% Score: 42.8 (04/26/21 1539)  ADL Inpatient CMS G-Code Modifier : CK (04/26/21 1539)    Goals  Short term goals  Time Frame for Short term goals: 10 visits  Short term goal 1: Pt will IND demo good safety awareness for increased participation to perform functional mobility/transfers and ADLs. Short term goal 2: Pt will demo Mod IND, with least restrictive AD, to perform functional mobility/transfers during ADLs. Short term goal 3: Pt will demo Mod IND, with DME/AE as needed, to perform ADLs. Short term goal 4: Pt will tolerate 10+ minutes of dynamic standing during functional tasks for increased participation during ADLs. Short term goal 5: Pt will IND incorporate energy conservation technqiues for increased participation throughout all functional activities.        Therapy Time   Individual Concurrent Group Co-treatment   Time In 1400         Time Out 1442         Minutes 42         Timed Code Treatment Minutes: 8 Minutes       Darleen Pat OTR/L

## 2021-04-26 NOTE — H&P
[facility]  Office: [address], DO, Nichelle Haynes, DO, Manjeet Almeida, DO, Deirdre Jay Blood, DO, Judith Boateng MD, Rossy Aguilar MD, Cyn Clark MD, Rekha Workman MD, Clarissa Flores MD, Jany Ferreira MD, Parul Escobar MD, Kristen Bryant MD, Prakash Huffman DO, Sanjuanita Bond MD, Genesis Pagan DO, Maya Shields MD,  Woo Kumar DO, Bg Perez MD, Francoise Azar MD, Vipin Adames MD, Jeanne Emerson MD, Cheyenne Jones, [facility], [facility], CNP, Treva Lock, CNP, Kieran Nazario, CNS, Neha Matson, CNP, Jocelyn Hughes, CNP, Rosalva Damon, CNP, Alexander Oglesby, CNP, Sunday Hoang, CNP, Idania Lindsey PA-C, Joaquim Essex, [facility], Jason Sinha, CNP, Huseyin Wyatt, CNP, Arnlufo Haro, CNP, [place] Joseph, CNP, Carlos Alejandre, CNP, Baron Hinojosa, CNP         [facility]   [address] [facility]    HISTORY AND PHYSICAL EXAMINATION            Date:   4/26/2021  Patient name:  Shari Harley  Date of admission:  4/25/2021  8:19 PM  MRN:   [id number]  Account:  [de-identified]  YOB: 1950  PCP:    Jasmin Cosby DO  Room:   [address] [place] [address]  Code Status:    Full Code    Chief Complaint:     Chief Complaint   Patient presents with    Emesis     onset at 1700    Dizziness       History Obtained From:     patient    History of Present Illness:     Shari Harley is a 78 y.o. Non-/non  female who presents with Emesis (onset at 1700) and Dizziness   and is admitted to the hospital for the management of Dizzy. The patient presented to the ER with complaints of lightheadedness, dizziness, nausea and vomiting that started acutely around 5 pm. She told the ER physician she just didn't feel right and somewhat confused. Her son just arrived and states she was also diaphoretic at the time. She reports normal stool routine and no dysuria, frequency, or urinary issues. On arrival to the ER she was afebrile, HR 66 and /69.  CT of the head and CTA head and neck were negative for acute finding but there is mention of chronic Bilateral maxillary and ethmoid sinus disease. Initial lactic 2.3 repeat after fluids 1.5. BMP, LFT's  and CBC are unremarkable. Serial trops are negative. UA had a small amt of urine hgb, nitrate negative and no leukocyte esterase. In the Er she received IV fluids, Zofran and a dose of Rocephin    This morning during my assessment her neuro assessment is negative but she continues to complain of feeling lightheaded, dizzy and she is having dry heeves. She received phenergan 12.5 mg IM with good results. MRI brain without contrast and Neuro consulted. Her son suggests a KUB     Echo 2021  1. Left ventricular systolic function is normal with an ejection fraction by  Biplane Method of Discs of 61 %.    2. No significant valvular/pericardial disease present. NM MYOCARDIAL PERFUSION MULTI SPECT 2021  Summary    1. Myocardial perfusion imaging is normal.    2. Normal left ventricular systolic function.    Non-diagnostic stress electrocardiogram secondary to resting  electrocardiographic abnormalities.    Abnormal stress electrocardiogram.    2021 Hemoglobin A1C 5.8      Past Medical History:     Past Medical History:   Diagnosis Date    Hypertension     Nephropathy     ANTIONETTE on CPAP         Past Surgical History:     Past Surgical History:   Procedure Laterality Date     SECTION          Medications Prior to Admission:     Prior to Admission medications    Medication Sig Start Date End Date Taking? Authorizing Provider   amLODIPine Besylate (NORVASC PO) Take 10 mg by mouth daily    Yes Historical Provider, MD   METOPROLOL TARTRATE PO Take 50 mg by mouth daily    Yes Historical Provider, MD   Furosemide (LASIX PO) Take 20 mg by mouth 2 times daily    Yes Historical Provider, MD        Allergies:     Patient has no known allergies. Social History:     Tobacco:    reports that she has never smoked.  She has never used smokeless tobacco.  Alcohol:      reports no history of alcohol use. Drug Use:  reports no history of drug use. Family History:     History reviewed. No pertinent family history. Review of Systems:     Positive and Negative as described in HPI. Review of Systems   Constitutional: Negative for chills, diaphoresis and fever. HENT: Negative for congestion and hearing loss. Eyes: Negative. Respiratory: Negative for cough, shortness of breath, wheezing and stridor. Cardiovascular: Negative for chest pain, palpitations and leg swelling. Gastrointestinal: Positive for nausea and vomiting. Negative for abdominal pain, blood in stool, constipation and diarrhea. Genitourinary: Negative for dysuria and frequency. Musculoskeletal: Negative for myalgias. Skin: Negative for rash. Neurological: Positive for dizziness and light-headedness. Negative for seizures and headaches. Psychiatric/Behavioral: The patient is not nervous/anxious. Physical Exam:   BP (!) 154/72   Pulse 76   Temp 98.2 °F (36.8 °C) (Oral)   Resp 16   Ht 5' 2\" (1.575 m)   Wt 180 lb (81.6 kg)   SpO2 100%   BMI 32.92 kg/m²   Temp (24hrs), Av.6 °F (36.4 °C), Min:97.3 °F (36.3 °C), Max:98.2 °F (36.8 °C)    Recent Labs     21   POCGLU 134*       Intake/Output Summary (Last 24 hours) at 2021 1032  Last data filed at 2021 0000  Gross per 24 hour   Intake 1000 ml   Output 300 ml   Net 700 ml       Physical Exam  Vitals signs and nursing note reviewed. Constitutional:       General: She is not in acute distress. Appearance: She is well-developed. She is not diaphoretic. HENT:      Head: Normocephalic and atraumatic. Right Ear: Hearing normal.      Left Ear: Hearing normal.      Nose: Nose normal. No rhinorrhea. Eyes:      General: Lids are normal.      Extraocular Movements:      Right eye: Normal extraocular motion. Left eye: Normal extraocular motion. Differential Type NOT REPORTED     Seg Neutrophils 49 36 - 66 %    Lymphocytes 39 24 - 44 %    Monocytes 6 2 - 11 %    Eosinophils % 5 (H) 1 - 4 %    Basophils 1 0 - 2 %    Immature Granulocytes NOT REPORTED 0 %    Segs Absolute 4.20 1.8 - 7.7 k/uL    Absolute Lymph # 3.30 1.0 - 4.8 k/uL    Absolute Mono # 0.50 0.1 - 1.2 k/uL    Absolute Eos # 0.40 0.0 - 0.4 k/uL    Basophils Absolute 0.10 0.0 - 0.2 k/uL    Absolute Immature Granulocyte NOT REPORTED 0.00 - 0.30 k/uL    WBC Morphology NOT REPORTED     RBC Morphology NOT REPORTED     Platelet Estimate NOT REPORTED    Comprehensive Metabolic Panel w/ Reflex to MG    Collection Time: 04/25/21  8:40 PM   Result Value Ref Range    Glucose 173 (H) 70 - 99 mg/dL    BUN 17 8 - 23 mg/dL    CREATININE 0.58 0.50 - 0.90 mg/dL    Bun/Cre Ratio NOT REPORTED 9 - 20    Calcium 9.3 8.6 - 10.4 mg/dL    Sodium 139 135 - 144 mmol/L    Potassium 3.7 3.7 - 5.3 mmol/L    Chloride 102 98 - 107 mmol/L    CO2 25 20 - 31 mmol/L    Anion Gap 12 9 - 17 mmol/L    Alkaline Phosphatase 65 35 - 104 U/L    ALT 12 5 - 33 U/L    AST 16 <32 U/L    Total Bilirubin 0.40 0.3 - 1.2 mg/dL    Total Protein 6.4 6.4 - 8.3 g/dL    Albumin 3.3 (L) 3.5 - 5.2 g/dL    Albumin/Globulin Ratio 1.1 1.0 - 2.5    GFR Non-African American >60 >60 mL/min    GFR African American >60 >60 mL/min    GFR Comment          GFR Staging NOT REPORTED    Lipase    Collection Time: 04/25/21  8:40 PM   Result Value Ref Range    Lipase 42 13 - 60 U/L   Amylase    Collection Time: 04/25/21  8:40 PM   Result Value Ref Range    Amylase 70 28 - 100 U/L   Troponin    Collection Time: 04/25/21  8:40 PM   Result Value Ref Range    Troponin, High Sensitivity <6 0 - 14 ng/L    Troponin T NOT REPORTED <0.03 ng/mL    Troponin Interp NOT REPORTED    Brain Natriuretic Peptide    Collection Time: 04/25/21  8:40 PM   Result Value Ref Range    Pro-BNP 33 <300 pg/mL    BNP Interpretation Pro-BNP Reference Range:    D-Dimer, Quantitative    Collection Time: 04/25/21  8:40 PM   Result Value Ref Range    D-Dimer, Quant 0.42 mg/L FEU   Sedimentation Rate    Collection Time: 04/25/21  8:40 PM   Result Value Ref Range    Sed Rate 35 (H) 0 - 30 mm   C-Reactive Protein    Collection Time: 04/25/21  8:40 PM   Result Value Ref Range    CRP <3.0 0.0 - 5.0 mg/L   Lactate, Sepsis    Collection Time: 04/25/21  8:40 PM   Result Value Ref Range    Lactic Acid, Sepsis 2.3 (H) 0.5 - 1.9 mmol/L    Lactic Acid, Sepsis, Whole Blood NOT REPORTED 0.5 - 1.9 mmol/L   SARS-CoV-2 NAAT (Rapid)    Collection Time: 04/25/21  8:43 PM    Specimen: Nasopharyngeal Swab   Result Value Ref Range    Specimen Description . NASOPHARYNGEAL SWAB     SARS-CoV-2, Rapid Not Detected Not Detected   POC Glucose Fingerstick    Collection Time: 04/25/21  8:47 PM   Result Value Ref Range    POC Glucose 134 (H) 65 - 105 mg/dL   Urinalysis Reflex to Culture    Collection Time: 04/25/21 10:43 PM    Specimen: Urine, clean catch   Result Value Ref Range    Color, UA YELLOW YELLOW    Turbidity UA CLEAR CLEAR    Glucose, Ur NEGATIVE NEGATIVE    Bilirubin Urine NEGATIVE NEGATIVE    Ketones, Urine NEGATIVE NEGATIVE    Specific Gravity, UA 1.025 1.005 - 1.030    Urine Hgb SMALL (A) NEGATIVE    pH, UA 7.0 5.0 - 8.0    Protein, UA 3+ (A) NEGATIVE    Urobilinogen, Urine Normal Normal    Nitrite, Urine NEGATIVE NEGATIVE    Leukocyte Esterase, Urine NEGATIVE NEGATIVE    Urinalysis Comments NOT REPORTED    Microscopic Urinalysis    Collection Time: 04/25/21 10:43 PM   Result Value Ref Range    -          WBC, UA 2 TO 5 0 - 5 /HPF    RBC, UA 2 TO 5 0 - 2 /HPF    Casts UA 2 TO 5 /LPF    Casts UA HYALINE /LPF    Crystals, UA NOT REPORTED None /HPF    Epithelial Cells UA 2 TO 5 0 - 5 /HPF    Renal Epithelial, UA NOT REPORTED 0 /HPF    Bacteria, UA None None    Mucus, UA 2+ (A) None    Trichomonas, UA NOT REPORTED None    Amorphous, UA NOT REPORTED None    Other Observations UA (A) NOT REQ.      Utilizing a urinalysis as the only screening method to exclude a potential uropathogen can be unreliable in many patient populations. Rapid screening tests are less sensitive than culture and if UTI is a clinical possibility, culture should be considered despite a negative urinalysis. Yeast, UA NOT REPORTED None   Lactate, Sepsis    Collection Time: 04/25/21 11:05 PM   Result Value Ref Range    Lactic Acid, Sepsis 1.5 0.5 - 1.9 mmol/L    Lactic Acid, Sepsis, Whole Blood NOT REPORTED 0.5 - 1.9 mmol/L   Troponin    Collection Time: 04/25/21 11:05 PM   Result Value Ref Range    Troponin, High Sensitivity 6 0 - 14 ng/L    Troponin T NOT REPORTED <0.03 ng/mL    Troponin Interp NOT REPORTED    Troponin    Collection Time: 04/26/21  1:55 AM   Result Value Ref Range    Troponin, High Sensitivity <6 0 - 14 ng/L    Troponin T NOT REPORTED <0.03 ng/mL    Troponin Interp NOT REPORTED    Troponin    Collection Time: 04/26/21  6:00 AM   Result Value Ref Range    Troponin, High Sensitivity 6 0 - 14 ng/L    Troponin T NOT REPORTED <0.03 ng/mL    Troponin Interp NOT REPORTED    CBC    Collection Time: 04/26/21  6:00 AM   Result Value Ref Range    WBC 8.8 3.5 - 11.0 k/uL    RBC 4.60 4.0 - 5.2 m/uL    Hemoglobin 13.1 12.0 - 16.0 g/dL    Hematocrit 40.9 36 - 46 %    MCV 88.9 80 - 100 fL    MCH 28.5 26 - 34 pg    MCHC 32.0 31 - 37 g/dL    RDW 13.5 12.5 - 15.4 %    Platelets 651 130 - 007 k/uL    MPV 9.5 6.0 - 12.0 fL    NRBC Automated NOT REPORTED per 100 WBC       Imaging/Diagnostics:    Ct Head Wo Contrast    Result Date: 4/25/2021  No acute intracranial abnormality. Bilateral maxillary and ethmoid sinus disease. Xr Chest Portable    Result Date: 4/25/2021  No acute process. Cta Head Neck W Contrast    Result Date: 4/25/2021  Mild atherosclerotic disease.   No large vessel occlusion or hemodynamic stenosis identified       Assessment :      Hospital Problems           Last Modified POA    * (Principal) Dizzy 4/26/2021 Yes    Hypertension 4/26/2021 Yes Hypercholesterolemia 4/26/2021 Yes    Osteoarthritis (Chronic) 4/26/2021 Yes    Overview Signed 4/26/2021  7:23 AM by FRANKY Goodwin CNP     knees and back pain         Migraine without aura and without status migrainosus, not intractable (Chronic) 4/26/2021 Yes    Sleep apnea (Chronic) 4/26/2021 Yes    Overview Addendum 4/26/2021 10:15 AM by FRANKY Goodwin CNP     She does wear cpap               Plan:     Patient status observation in the Med/Surge    Lightheaded and Dizziness; MRI brain without contrast, Zofran, phenergan IM x's 1, scheduled Antivert. Neurology consulted. Np overnight started baby asa and Lipitor. Orthostatic BP and pulse per shift. PT/OT evaluate and treat    KUB related to nausea and vomiting. Resume home amlodipine, BB and lasix when appropriate.  Lopressor prn    Check TSH, Vit B 12, folate and Vit D    DVT and PPI prophylaxis    Ok for home Cpap      Consultations:   FRANKY Booker CNP  4/26/2021  10:32 AM    Copy sent to Dr. Mychal Dotson,

## 2021-04-26 NOTE — CONSULTS
file     Attends meetings of clubs or organizations: Not on file     Relationship status: Not on file    Intimate partner violence     Fear of current or ex partner: Not on file     Emotionally abused: Not on file     Physically abused: Not on file     Forced sexual activity: Not on file   Other Topics Concern    Not on file   Social History Narrative    Not on file     History reviewed. No pertinent family history. No Known Allergies   BP (!) 154/72   Pulse 76   Temp 98.2 °F (36.8 °C) (Oral)   Resp 16   Ht 5' 2\" (1.575 m)   Wt 180 lb (81.6 kg)   SpO2 100%   BMI 32.92 kg/m²    ROS:  Constitutional Negative for fever and chills   HEENT Negative for ear discharge, ear pain, nosebleed   Eyes Negative for photophobia, pain and discharge   Respiratory Negative for hemoptysis and sputum   Cardiovascular Negative for orthopnea, claudication and PND   Gastrointestinal Negative for abdominal pain, diarrhea, blood in stool   Musculoskeletal Negative for joint pain, negative for myalgia   Skin Negative for rash or itching   Endo/heme/allergies Negative for polydipsia, environmental allergy   Psychiatric/behavioral Negative for suicidal ideation.   Patient is not anxious   General examination:    Head: Normocephalic, atraumatic  Eyes: Extraocular movements intact  Lungs: Respirations unlabored, chest wall no deformity  ENT: Normal external ear canals, no sinus tenderness  Heart: Regular rate rhythm  Abdomen: No masses, tenderness  Extremities: No cyanosis or edema, 2+ pulses  Skin: Intact, normal skin color    Neurological examination:    Mental status   Alert and oriented; intact memory with no confusion, speech or language problems; no hallucinations or delusions     Cranial nerves   II - visual fields intact to confrontation                                                III, IV, VI  extra-ocular muscles full: no pupillary defect; no DARIO, no nystagmus, no ptosis V - normal facial sensation                                                               VII - normal facial symmetry                                                             VIII - intact hearing                                                                             IX, X - symmetrical palate                                                                  XI - symmetrical shoulder shrug                                                       XII - midline tongue without atrophy or fasciculation     Motor function  Normal muscle bulk and tone; normal power 5/5, including fine motor movements     Sensory function Intact to touch, pin, vibration, proprioception     Cerebellar  mild finger-nose ataxia on the left side     Reflex function Intact 2+ DTR and symmetric. Negative Babinski     Gait                  Not tested         No results found for: LDLCALC, LDLCHOLESTEROL, LDLDIRECT  No components found for: CHLPL  No results found for: TRIG  No results found for: HDL  No results found for: LDLCALC  No results found for: LABVLDL  No results found for: LABA1C  No results found for: EAG  No results found for: [id number]   Neurological work up:  CT head 4/25/2021 unremarkable  CTA head and neck 4/25/2021 unremarkable  MRI brain     2 D echo     Assessment and Recommendations:   Acute left cerebellar ischemic stroke  Radiographic evidence of old right cerebellar stroke  Stroke risk factors include hypertension and age    I will start patient on Plavix 75 mg a day and continue aspirin 81 mg a day with a plan for dual antiplatelet medications for 3 weeks followed by aspirin 81 mg a day long-term    Initiate Lipitor 20 mg p.o. nightly.   Will check for lipid profile and hemoglobin A1c    The patient had 2D echo done on 4/7/2021 at outside facility which was unremarkable and a negative stress test.  I would not repeat the echocardiogram and would recommend to discharge the patient on 30 days Holter monitoring to rule out cardiac dysrhythmia/atrial fibrillation. PT OT evaluation    Once stable, okay to discharge from neurological standpoint with outpatient follow-up with neurology in 4 to 6 weeks. I offered the patient to follow-up locally in [place] where she lives or to have a televisit with me if she cannot find a local neurologist.    We will follow. Thank you for the consult. Vinny Verma MD  Neurology    This note is created with the assistance of a speech-recognition program. While intending to generate a document that actually reflects the content of the visit, the document can still have some errors including those of syntax and sound a- like substitutions which may escape proofreading. In such instances, actual meaning can be extrapolated by contextual derivation.

## 2021-04-26 NOTE — PROGRESS NOTES
Pt admitted to room from ER  Oriented to room and call light/tv controls. Bed in lowest position, wheels locked, 2/4 side rails up  Call light in reach, room free of clutter, adequate lighting provided. Admission questions completed.   Jesus Wilkinson NP notified of orthostatic BP

## 2021-04-26 NOTE — ED PROVIDER NOTES
[facility]  EMERGENCY DEPARTMENT ENCOUNTER      Pt Name: Shari Harley  MRN: [[id number]]  [place] 1950  Date of evaluation: 4/25/2021  Provider: Boris Krause MD    [address] [place] [address]       Chief Complaint   Patient presents with    Emesis     onset at 1700    Dizziness       HISTORY OF PRESENT ILLNESS  (Location/Symptom, Timing/Onset, Context/Setting, Quality, Duration, Modifying Factors, Severity.)   Shari Harley is a 78 y.o. female who presents to the emergency department complaining of nausea, vomiting and dizziness that occurred suddenly at 5:00 tonight. She relates she has not had anything like this before. She denies any chest pain or shortness of breath. She does relate that she just does not feel right and feels like she is in a fall can somewhat confused. Her son is a local physician and relates this is just not like her at all. So they were concerned and wanted her to be evaluated. Nursing Notes were reviewed. REVIEW OF SYSTEMS    (2-9 systems for level 4, 10 or more for level 5)     Review of Systems   Constitutional: Negative for activity change, appetite change, chills, fatigue and fever. HENT: Negative for congestion, ear pain and sore throat. Eyes: Negative for pain, discharge and redness. Respiratory: Negative for cough, shortness of breath, wheezing and stridor. Cardiovascular: Negative for chest pain. Gastrointestinal: Positive for nausea and vomiting. Negative for abdominal pain, constipation and diarrhea. Genitourinary: Negative for decreased urine volume and difficulty urinating. Musculoskeletal: Negative for arthralgias and myalgias. Skin: Negative for color change and rash. Neurological: Positive for dizziness. Negative for weakness and headaches. Psychiatric/Behavioral: Negative for behavioral problems and confusion. Except as noted above the remainder of the review of systems was reviewed and negative.        PAST MEDICAL HISTORY     Past Medical History:   Diagnosis Date    Hypertension     Nephropathy        SURGICAL HISTORY       Past Surgical History:   Procedure Laterality Date     SECTION         CURRENT MEDICATIONS       Previous Medications    AMLODIPINE BESYLATE (NORVASC PO)    Take by mouth    FUROSEMIDE (LASIX PO)    Take by mouth    METOPROLOL TARTRATE PO    Take by mouth       ALLERGIES     Patient has no known allergies. FAMILY HISTORY     History reviewed. No pertinent family history. No family status information on file. SOCIAL HISTORY      reports that she has never smoked. She has never used smokeless tobacco. She reports that she does not drink alcohol or use drugs. PHYSICAL EXAM    (up to 7 for level 4, 8 or more for level 5)     ED Triage Vitals [21]   BP Temp Temp Source Pulse Resp SpO2 Height Weight   (!) 169/69 97.3 °F (36.3 °C) Oral 66 18 95 % 5' 2\" (1.575 m) 180 lb (81.6 kg)     Physical Exam  Constitutional:       General: She is in acute distress. Appearance: She is well-developed. She is ill-appearing. She is not toxic-appearing or diaphoretic. HENT:      Head: Normocephalic and atraumatic. Right Ear: External ear normal.      Left Ear: External ear normal.      Nose: Nose normal.      Mouth/Throat:      Mouth: Mucous membranes are moist.   Eyes:      General:         Right eye: No discharge. Left eye: No discharge. Conjunctiva/sclera: Conjunctivae normal.      Pupils: Pupils are equal, round, and reactive to light. Neck:      Musculoskeletal: Normal range of motion and neck supple. Cardiovascular:      Rate and Rhythm: Normal rate and regular rhythm. Heart sounds: Normal heart sounds. No murmur. Pulmonary:      Effort: Pulmonary effort is normal. No respiratory distress. Breath sounds: Normal breath sounds. No wheezing, rhonchi or rales. Chest:      Chest wall: No tenderness.    Abdominal:      General: Bowel sounds are normal. There is no distension. Palpations: Abdomen is soft. There is no mass. Tenderness: There is no abdominal tenderness. There is no guarding or rebound. Musculoskeletal: Normal range of motion. Right lower leg: No edema. Left lower leg: No edema. Skin:     General: Skin is warm. Findings: No rash. Neurological:      General: No focal deficit present. Mental Status: She is alert and oriented to person, place, and time. Cranial Nerves: No cranial nerve deficit. Sensory: No sensory deficit. Motor: No weakness or abnormal muscle tone. Coordination: Coordination normal.      Gait: Gait normal.   Psychiatric:         Mood and Affect: Mood normal.         Behavior: Behavior normal.         DIAGNOSTIC RESULTS     EKG: All EKG's are interpreted by the Emergency Department Physician who either signs or Co-signs this chart in the absence of a cardiologist.    EKG Interpretation    Interpreted by me    Rhythm: normal sinus   Rate: normal  Axis: normal  Ectopy: none  Conduction: normal  ST Segments: no acute change  T Waves: Inverted in leads V4-V6  Q Waves: none    Clinical Impression: Nonspecific changes and abnormal EKG    RADIOLOGY:   Non-plain film images such as CT, Ultrasound and MRI are read by the radiologist. Plain radiographic images are visualized and preliminarily interpreted by the emergency physician with the below findings:    Interpretation per the Radiologist below, if available at the time of this note:    XR CHEST PORTABLE   Final Result   No acute process. CT Head WO Contrast   Final Result   No acute intracranial abnormality. Bilateral maxillary and ethmoid sinus disease.          CTA HEAD NECK W CONTRAST    (Results Pending)         ED BEDSIDE ULTRASOUND:   Performed by ED Physician - none    LABS:  Labs Reviewed   CBC WITH AUTO DIFFERENTIAL - Abnormal; Notable for the following components:       Result Value    Eosinophils % 5 (*)     All other components within normal limits   COMPREHENSIVE METABOLIC PANEL W/ REFLEX TO MG FOR LOW K - Abnormal; Notable for the following components:    Glucose 173 (*)     Albumin 3.3 (*)     All other components within normal limits   SEDIMENTATION RATE - Abnormal; Notable for the following components:    Sed Rate 35 (*)     All other components within normal limits   LACTATE, SEPSIS - Abnormal; Notable for the following components:    Lactic Acid, Sepsis 2.3 (*)     All other components within normal limits   POC GLUCOSE FINGERSTICK - Abnormal; Notable for the following components:    POC Glucose 134 (*)     All other components within normal limits   COVID-19, RAPID   LIPASE   AMYLASE   TROPONIN   BRAIN NATRIURETIC PEPTIDE   D-DIMER, QUANTITATIVE   C-REACTIVE PROTEIN   URINE RT REFLEX TO CULTURE   LACTATE, SEPSIS       All other labs were within normal range or not returned as of this dictation. EMERGENCY DEPARTMENT COURSE and DIFFERENTIAL DIAGNOSIS/MDM:   Vitals:    Vitals:    04/25/21 2023   BP: (!) 169/69   Pulse: 66   Resp: 18   Temp: 97.3 °F (36.3 °C)   TempSrc: Oral   SpO2: 95%   Weight: 81.6 kg (180 lb)   Height: 5' 2\" (1.575 m)     We did discuss lab work and imaging. EKG has been done. Fluids and nausea medication. I did speak with family at bedside as well. On reevaluation patient is really not feeling much better and still nauseous. They would like her to be admitted and she is not comfortable going home. CONSULTS:  Spoke with Anjali Rios CNP for admission. She accepts the patient. No further orders for us in the emergency department. PROCEDURES:  None    FINAL IMPRESSION      1. Non-intractable vomiting with nausea, unspecified vomiting type    2. Chronic maxillary sinusitis          DISPOSITION/PLAN   DISPOSITION  admit      PATIENT REFERRED TO:  No follow-up provider specified.     DISCHARGE MEDICATIONS:  New Prescriptions    No medications on file       (Please note that portions of

## 2021-04-26 NOTE — CARE COORDINATION
[facility] ICU Quality Flow/Interdisciplinary Rounds Progress Note    Quality Flow Rounds held on April 26, 2021 at [address] Attending:  Bedside Nurse, , , Nursing Unit Leadership, Dietary, Respiratory Therapy, Occupational Therapy and Speech Therapy    Anticipated Discharge Date:  Expected Discharge Date: 04/28/21    Anticipated Discharge Disposition:    Readmission Risk              Risk of Unplanned Readmission:        0           Discussed patient goal for the day, patient clinical progression, and barriers to discharge.   The following Goal(s) of the Day/Commitment(s) have been identified:  Activity Progression   , Occupational Therapy - Obtain Consult Order and Physical Therapy - Obtain Consult Order      [facility]  April 26, 2021

## 2021-04-27 PROBLEM — E55.9 VITAMIN D DEFICIENCY: Status: ACTIVE | Noted: 2021-04-27

## 2021-04-27 LAB
ANION GAP SERPL CALCULATED.3IONS-SCNC: 6 MMOL/L (ref 9–17)
BUN BLDV-MCNC: 15 MG/DL (ref 8–23)
BUN/CREAT BLD: ABNORMAL (ref 9–20)
CALCIUM SERPL-MCNC: 8.4 MG/DL (ref 8.6–10.4)
CHLORIDE BLD-SCNC: 111 MMOL/L (ref 98–107)
CO2: 27 MMOL/L (ref 20–31)
CREAT SERPL-MCNC: 0.52 MG/DL (ref 0.5–0.9)
EKG ATRIAL RATE: 68 BPM
EKG P AXIS: 33 DEGREES
EKG P-R INTERVAL: 178 MS
EKG Q-T INTERVAL: 398 MS
EKG QRS DURATION: 100 MS
EKG QTC CALCULATION (BAZETT): 423 MS
EKG R AXIS: 14 DEGREES
EKG T AXIS: 146 DEGREES
EKG VENTRICULAR RATE: 68 BPM
GFR AFRICAN AMERICAN: >60 ML/MIN
GFR NON-AFRICAN AMERICAN: >60 ML/MIN
GFR SERPL CREATININE-BSD FRML MDRD: ABNORMAL ML/MIN/{1.73_M2}
GFR SERPL CREATININE-BSD FRML MDRD: ABNORMAL ML/MIN/{1.73_M2}
GLUCOSE BLD-MCNC: 107 MG/DL (ref 65–105)
GLUCOSE BLD-MCNC: 132 MG/DL (ref 65–105)
GLUCOSE BLD-MCNC: 138 MG/DL (ref 70–99)
GLUCOSE BLD-MCNC: 93 MG/DL (ref 65–105)
POTASSIUM SERPL-SCNC: 4 MMOL/L (ref 3.7–5.3)
SODIUM BLD-SCNC: 144 MMOL/L (ref 135–144)
TROPONIN INTERP: NORMAL
TROPONIN T: NORMAL NG/ML
TROPONIN, HIGH SENSITIVITY: 7 NG/L (ref 0–14)

## 2021-04-27 PROCEDURE — 84156 ASSAY OF PROTEIN URINE: CPT

## 2021-04-27 PROCEDURE — 99232 SBSQ HOSP IP/OBS MODERATE 35: CPT | Performed by: INTERNAL MEDICINE

## 2021-04-27 PROCEDURE — 6370000000 HC RX 637 (ALT 250 FOR IP): Performed by: NURSE PRACTITIONER

## 2021-04-27 PROCEDURE — APPSS45 APP SPLIT SHARED TIME 31-45 MINUTES: Performed by: NURSE PRACTITIONER

## 2021-04-27 PROCEDURE — 83036 HEMOGLOBIN GLYCOSYLATED A1C: CPT

## 2021-04-27 PROCEDURE — C9113 INJ PANTOPRAZOLE SODIUM, VIA: HCPCS | Performed by: NURSE PRACTITIONER

## 2021-04-27 PROCEDURE — 84484 ASSAY OF TROPONIN QUANT: CPT

## 2021-04-27 PROCEDURE — 1210000000 HC MED SURG R&B

## 2021-04-27 PROCEDURE — 93005 ELECTROCARDIOGRAM TRACING: CPT | Performed by: NURSE PRACTITIONER

## 2021-04-27 PROCEDURE — 6360000002 HC RX W HCPCS

## 2021-04-27 PROCEDURE — 80048 BASIC METABOLIC PNL TOTAL CA: CPT

## 2021-04-27 PROCEDURE — 6360000002 HC RX W HCPCS: Performed by: NURSE PRACTITIONER

## 2021-04-27 PROCEDURE — 2580000003 HC RX 258: Performed by: NURSE PRACTITIONER

## 2021-04-27 PROCEDURE — 94761 N-INVAS EAR/PLS OXIMETRY MLT: CPT

## 2021-04-27 PROCEDURE — 94640 AIRWAY INHALATION TREATMENT: CPT

## 2021-04-27 PROCEDURE — 36415 COLL VENOUS BLD VENIPUNCTURE: CPT

## 2021-04-27 PROCEDURE — 97116 GAIT TRAINING THERAPY: CPT

## 2021-04-27 PROCEDURE — 82947 ASSAY GLUCOSE BLOOD QUANT: CPT

## 2021-04-27 PROCEDURE — 2500000003 HC RX 250 WO HCPCS: Performed by: NURSE PRACTITIONER

## 2021-04-27 PROCEDURE — 99232 SBSQ HOSP IP/OBS MODERATE 35: CPT | Performed by: PSYCHIATRY & NEUROLOGY

## 2021-04-27 PROCEDURE — 82570 ASSAY OF URINE CREATININE: CPT

## 2021-04-27 PROCEDURE — 2700000000 HC OXYGEN THERAPY PER DAY

## 2021-04-27 PROCEDURE — 97535 SELF CARE MNGMENT TRAINING: CPT

## 2021-04-27 PROCEDURE — 6370000000 HC RX 637 (ALT 250 FOR IP): Performed by: INTERNAL MEDICINE

## 2021-04-27 RX ORDER — ALBUTEROL SULFATE 2.5 MG/3ML
SOLUTION RESPIRATORY (INHALATION)
Status: COMPLETED
Start: 2021-04-27 | End: 2021-04-27

## 2021-04-27 RX ORDER — PANTOPRAZOLE SODIUM 40 MG/10ML
40 INJECTION, POWDER, LYOPHILIZED, FOR SOLUTION INTRAVENOUS DAILY
Status: DISCONTINUED | OUTPATIENT
Start: 2021-04-27 | End: 2021-04-29 | Stop reason: HOSPADM

## 2021-04-27 RX ORDER — SCOLOPAMINE TRANSDERMAL SYSTEM 1 MG/1
1 PATCH, EXTENDED RELEASE TRANSDERMAL
Status: DISCONTINUED | OUTPATIENT
Start: 2021-04-27 | End: 2021-04-29 | Stop reason: HOSPADM

## 2021-04-27 RX ORDER — ERGOCALCIFEROL 1.25 MG/1
50000 CAPSULE ORAL WEEKLY
Status: DISCONTINUED | OUTPATIENT
Start: 2021-04-27 | End: 2021-04-29 | Stop reason: HOSPADM

## 2021-04-27 RX ORDER — IPRATROPIUM BROMIDE AND ALBUTEROL SULFATE 2.5; .5 MG/3ML; MG/3ML
1 SOLUTION RESPIRATORY (INHALATION) EVERY 4 HOURS PRN
Status: DISCONTINUED | OUTPATIENT
Start: 2021-04-27 | End: 2021-04-28

## 2021-04-27 RX ADMIN — ATORVASTATIN CALCIUM 20 MG: 10 TABLET, FILM COATED ORAL at 22:44

## 2021-04-27 RX ADMIN — ERGOCALCIFEROL 50000 UNITS: 1.25 CAPSULE ORAL at 11:17

## 2021-04-27 RX ADMIN — METOPROLOL TARTRATE 2.5 MG: 5 INJECTION INTRAVENOUS at 22:43

## 2021-04-27 RX ADMIN — ALBUTEROL SULFATE 2.5 MG: 2.5 SOLUTION RESPIRATORY (INHALATION) at 09:26

## 2021-04-27 RX ADMIN — ASPIRIN 81 MG: 81 TABLET, CHEWABLE ORAL at 10:30

## 2021-04-27 RX ADMIN — METOPROLOL TARTRATE 2.5 MG: 5 INJECTION INTRAVENOUS at 16:17

## 2021-04-27 RX ADMIN — METOCLOPRAMIDE 10 MG: 5 INJECTION, SOLUTION INTRAMUSCULAR; INTRAVENOUS at 02:53

## 2021-04-27 RX ADMIN — IPRATROPIUM BROMIDE AND ALBUTEROL SULFATE 1 AMPULE: .5; 3 SOLUTION RESPIRATORY (INHALATION) at 09:27

## 2021-04-27 RX ADMIN — CLOPIDOGREL BISULFATE 75 MG: 75 TABLET ORAL at 10:30

## 2021-04-27 RX ADMIN — PANTOPRAZOLE SODIUM 40 MG: 40 INJECTION, POWDER, FOR SOLUTION INTRAVENOUS at 09:10

## 2021-04-27 RX ADMIN — ENOXAPARIN SODIUM 40 MG: 40 INJECTION SUBCUTANEOUS at 09:10

## 2021-04-27 RX ADMIN — SODIUM CHLORIDE, PRESERVATIVE FREE 10 ML: 5 INJECTION INTRAVENOUS at 09:11

## 2021-04-27 ASSESSMENT — PAIN SCALES - GENERAL
PAINLEVEL_OUTOF10: 0
PAINLEVEL_OUTOF10: 0

## 2021-04-27 NOTE — PROGRESS NOTES
that includes  section. Restrictions  Restrictions/Precautions  Restrictions/Precautions: Fall Risk  Required Braces or Orthoses?: No  Position Activity Restriction  Other position/activity restrictions: up with assistance - 2L O2 via nasal cannula while resting only per respiratory therapy  Subjective   General  Response To Previous Treatment: Patient with no complaints from previous session. Family / Caregiver Present: No  Subjective  Subjective: Pt up in chair and agreeable to therapy. Pt continues to c/o dizziness and nausea but no pain. Pain Screening  Patient Currently in Pain: Denies  Pain Assessment  Pain Assessment: 0-10  Pain Level: 0  Vital Signs  Patient Currently in Pain: Denies       Orientation  Orientation  Overall Orientation Status: Within Functional Limits  Cognition      Objective   Bed mobility  Comment: Pt up in chair at start of session and retired to chair at completion. Transfers  Sit to Stand: Contact guard assistance  Stand to sit: Contact guard assistance  Stand Pivot Transfers: Minimal Assistance  Comment: unsteady with min A for LOB- mainly LOB occurs to the left; cues to keep walker close  Ambulation  Ambulation?: Yes  Ambulation 1  Surface: level tile  Device: Rolling Walker  Assistance: Minimal assistance;Contact guard assistance;Maximum assistance  Quality of Gait: unsteady with multiple small LOBs to left requiring min A to correct; pt had one large LOB to the left that required max assist of therapist to correct; decreased step length, decreased gait speed, wandering gait  Gait Deviations: Slow Rochelle;Decreased step length  Comments: Pt is unable to self correct balance losses at this time due to vertigo/dizziness; flutuating assist needed from CGA-Max depending on balance losses; reminders needed to keep walker close to center of gravity.   Stairs/Curb  Stairs?: Yes  Stairs  # Steps : 2  Stairs Height: 6\"  Rails: None  Device: Rolling walker  Assistance: Minimal assistance;2 Person assistance  Comment: Pt negotiated 1-6\" step x 2 reps with RW and Min assist x 2; pt unsteady.      Balance  Posture: Fair  Sitting - Static: Good;-  Sitting - Dynamic: Fair;+  Standing - Static: Fair  Standing - Dynamic: Fair  Comments: standing balance with RW; overall dynamic balance Fair except for 1 large LOB requiring max assist to recover                           G-Code     OutComes Score                                                     AM-PAC Score             Goals  Short term goals  Time Frame for Short term goals: 14 visits  Short term goal 1: Pt to ambulate 300ft independently without device to allow for return to prior functional level  Short term goal 2: Pt to sit <> stand transfer independently to allow for return to prior functional level  Short term goal 3: Pt to tolerate 45 minutes worth of therapy for endurance  Short term goal 4: Pt to demonstrate good to good - standing balance to decrease risk of falls  Short term goal 5: Pt to ascend/descend flight of stairs SBA to allow for access to typical bedroom level of home    Plan    Plan  Times per week: 5-6x  Times per day: Daily(1-2x per day)  Current Treatment Recommendations: Strengthening, Transfer Training, Balance Training, Functional Mobility Training, Endurance Training, Gait Training, Stair training, Home Exercise Program, Safety Education & Training, Patient/Caregiver Education & Training, Neuromuscular Re-education  Safety Devices  Type of devices: Call light within reach, Gait belt, Nurse notified, Left in chair, Patient at risk for falls  Restraints  Initially in place: No     Therapy Time   Individual Concurrent Group Co-treatment   Time In 8082         Time Out 0909         Minutes 27         Timed Code Treatment Minutes: [address], PT

## 2021-04-27 NOTE — CARE COORDINATION
[facility] ICU Quality Flow/Interdisciplinary Rounds Progress Note    Quality Flow Rounds held on April 27, 2021 at [address] Attending:  Bedside Nurse, , , Nursing Unit Leadership, Dietary, Occupational Therapy and Speech Therapy    Anticipated Discharge Date:  Expected Discharge Date: 04/28/21    Anticipated Discharge Disposition: home v ARU    Readmission Risk              Risk of Unplanned Readmission:        13           Discussed patient goal for the day, patient clinical progression, and barriers to discharge.   The following Goal(s) of the Day/Commitment(s) have been identified:  Activity Progression    and Choice - Obtain Post-Acute Preference(s)      Chandra Klinefelter  April 27, 2021

## 2021-04-27 NOTE — PROGRESS NOTES
[facility]) Neurology Specialist  Melanie Velasquez [address]  [place], [address] [place] [address] 30:  [phone] or [phone]  FAX:  [phone]            Brief history: Luzmaria Hall is a 78 y.o. old female admitted on 4/25/2021 with left cerebellar acute stroke     Subjective: The patient is more awake today. She continues to have nausea but improved from yesterday. The patient denies having any new complaints.      Objective: /70   Pulse 59   Temp 98.5 °F (36.9 °C) (Oral)   Resp 20   Ht 5' 2\" (1.575 m)   Wt 193 lb 9 oz (87.8 kg)   SpO2 93%   BMI 35.40 kg/m²       Medications:    pantoprazole  40 mg Intravenous Daily    vitamin D  50,000 Units Oral Weekly    scopolamine  1 patch Transdermal Q72H    sodium chloride flush  5-40 mL Intravenous 2 times per day    aspirin  81 mg Oral Daily    enoxaparin  40 mg Subcutaneous Daily    clopidogrel  75 mg Oral Daily    atorvastatin  20 mg Oral Nightly    [Held by provider] metoclopramide  10 mg Intravenous Q6H        Neurological examination:    Mental status   Alert and oriented; intact memory with no confusion, speech or language problems; no hallucinations or delusions     Cranial nerves   II - visual fields intact to confrontation                                                III, IV, VI  extra-ocular muscles full: no pupillary defect; no DARIO, no nystagmus, no ptosis                                                                      V - normal facial sensation                                                               VII - normal facial symmetry                                                             VIII - intact hearing                                                                             IX, X - symmetrical palate                                                                  XI - symmetrical shoulder shrug                                                       XII - midline tongue without atrophy or fasciculation     Motor function  Normal muscle bulk and tone; normal power 5/5, including fine motor movements     Sensory function Intact to touch, pin, vibration, proprioception     Cerebellar  mild finger-nose ataxia on the left side     Reflex function Intact 2+ DTR and symmetric. Negative Babinski     Gait                  Not tested         Lab Results   Component Value Date    LDLCHOLESTEROL 162 (H) 04/26/2021     No components found for: CHLPL  Lab Results   Component Value Date    TRIG 143 04/26/2021     Lab Results   Component Value Date    HDL 67 04/26/2021     No results found for: LDLCALC  No results found for: LABVLDL  No results found for: LABA1C  No results found for: EAG  Lab Results   Component Value Date    PNKTGDLV23 341 04/26/2021      Neurological work up:  CT head 4/25/2021 unremarkable  CTA head and neck 4/25/2021 unremarkable  MRI brain     2 D echo     Assessment and Recommendations:   Acute left cerebellar ischemic stroke  Radiographic evidence of old right cerebellar stroke  Stroke risk factors include hypertension and age      The patient is improved from yesterday but continues to have dizziness and left-sided ataxia. I would continue patient on aspirin 81 mg a day and Plavix 75 mg a day along with Lipitor 20 mg p.o. nightly    Okay to discharge her Holter monitoring to inpatient rehab. Patient to follow-up with neurology next 4 to 6 weeks. We will sign off. Please call with questions. Thank you for the consult. Polina Brown MD  Neurology    This note is created with the assistance of a speech-recognition program. While intending to generate a document that actually reflects the content of the visit, the document can still have some errors including those of syntax and sound a- like substitutions which may escape proofreading. In such instances, actual meaning can be extrapolated by contextual derivation.

## 2021-04-27 NOTE — PROGRESS NOTES
Patients family visiting. Son asked for Scopolamine patch. Writer messaged night shift NP about concern and desire to try this medication. NP voiced that it can cause confusion, dizziness and disorientation. Does not feel it is safe to order for patient at this time. Writer went to update family but family had already left. Patient appears to not be in any distress, eyes closed and in stable condition. Will continue to monitor patient.

## 2021-04-27 NOTE — CARE COORDINATION
TRANSITIONAL CARE PLANNING/ 2 Rehab Sid Day: 2    Reason for Admission: Dizziness [R42]  Acute CVA (cerebrovascular accident) ([facility] [address] [I63.9]     Treatment Plan of Care: neuro - CVA    Tests/Procedures still needed:     Barriers to Discharge:     Readmission Risk    Risk of Unplanned Readmission:        13      Patient goals/Treatment Preferences/Transitional Plan: Inpatient Rehab    Referrals Made: Mary    Follow Up needed:     Met with patient and family to review transition plan for post d/c - discussed LOC and physical needs - patient dx of CVA with deficits - prior LOF independent. Appears to be a candidate for Inpatient Rehab. Patient lives near [facility]. Patient and family agreeable to referral to IP Rehab - provided choices in [place] area and [place] area - requested referral to [facility] to be sent. Referral faxed.     [id number] call from Jagruti Britt 0332 at [facility] - confirming receipt of referral

## 2021-04-27 NOTE — PROGRESS NOTES
Writer notified night shift NP that patient is complaining of chest pressure under her sternum. Stated \"its not painful but just pressure\". PRN EKG was completed with no new changes noted from previous. States NSR, ST and T wave abnormalities, consider lateral ischemia.  Vital signs 132/85 HR 69    NP ordered for one time troponin check    Will continue to monitor patient

## 2021-04-27 NOTE — PROGRESS NOTES
FOLLOW UP: Yes  Activity Tolerance  Activity Tolerance: Patient limited by fatigue  Safety Devices  Safety Devices in place: Yes  Type of devices: Call light within reach;Gait belt;Left in chair;Nurse notified         Patient Diagnosis(es): The primary encounter diagnosis was Non-intractable vomiting with nausea, unspecified vomiting type. A diagnosis of Chronic maxillary sinusitis was also pertinent to this visit. has a past medical history of Hypertension, Nephropathy, and ANTIONETTE on CPAP. has a past surgical history that includes  section. Restrictions  Restrictions/Precautions  Restrictions/Precautions: Fall Risk  Required Braces or Orthoses?: No  Position Activity Restriction  Other position/activity restrictions: Up with assistance     Subjective   General  Patient assessed for rehabilitation services?: Yes  Family / Caregiver Present: No  General Comment  Comments: RN okcharisse for tx today  Vital Signs  Patient Currently in Pain: Denies(Pt denies pain at this time)    Orientation  Orientation  Overall Orientation Status: Within Functional Limits     Objective    ADL  Grooming: Minimal assistance (standing sink side with UE support x1 required; pt performed oral care, facial hygiene, combed hair and cleaned glasses; increased time/effort to perform; 1x LOB requiring assist to correct)  LE Dressing: Minimal assistance; Increased time to complete (seated at edge of recliner chair to doff/don socks; CGA for L sock using figure four technique, increased time/effort for R sock due to R knee arthritis, pt educated in use of sock aid and verbalized understanding)        Balance  Sitting Balance: Contact guard assistance (seated at edge of recliner chair ~6-7 minutes)  Standing Balance:  Moderate assistance (~10 minutes total; pt unsteady with L lateral and posterior LOB throughout, increased time/effort)    Functional Mobility  Functional - Mobility Device: Rolling Walker  Activity: To/from bathroom  Assist Level: Moderate assistance  Comments: Pt required min A with use of RW however pt moderately unsteady with 2x LOB (L lateral LOB) during functional mobility tasks requiring mod A to correct LOB. Pt required increased time/effort to perform and with noted fatigue following activity. Mod VCs required throughout for initiation/sequencing, safety awareness, and RW mngt/navigation. Transfers  Sit to stand: Minimal assistance  Stand to sit: Minimal assistance  Comments: Mod VCs for proper hand placement/sequencing and safety awareness throughout functional transfers. Increased time/effort to perform. Cognition  Overall Cognitive Status: Exceptions  Arousal/Alertness: Delayed responses to stimuli  Following Commands: Follows multistep commands with repitition; Follows multistep commands with increased time  Safety Judgement: Decreased awareness of need for assistance;Decreased awareness of need for safety  Problem Solving: Assistance required to identify errors made;Assistance required to generate solutions;Assistance required to implement solutions  Insights: Decreased awareness of deficits  Initiation: Requires cues for some  Sequencing: Requires cues for some     Plan   Plan  Times per week: 5-6x/week  Times per day: Daily  Current Treatment Recommendations: Balance Training, Functional Mobility Training, Safety Education & Training, Equipment Evaluation, Education, & procurement, Self-Care / ADL, Neuromuscular Re-education, Home Management Training    Goals  Short term goals  Time Frame for Short term goals: 10 visits  Short term goal 1: Pt will IND demo good safety awareness for increased participation to perform functional mobility/transfers and ADLs. Short term goal 2: Pt will demo Mod IND, with least restrictive AD, to perform functional mobility/transfers during ADLs. Short term goal 3: Pt will demo Mod IND, with DME/AE as needed, to perform ADLs.   Short term goal 4: Pt will tolerate 10+ minutes of dynamic standing during functional tasks for increased participation during ADLs. Short term goal 5: Pt will IND incorporate energy conservation technqiues for increased participation throughout all functional activities.        Therapy Time   Individual Concurrent Group Co-treatment   Time In 0295         Time Out 1145         Minutes 23         Timed Code Treatment Minutes: Michael 90 S/OT

## 2021-04-27 NOTE — PLAN OF CARE
Problem: Infection:  Goal: Will remain free from infection  Description: Will remain free from infection  4/27/2021 0920 by Candy Mendoza RN  Outcome: Ongoing  4/27/2021 0243 by Kathleen Alan RN  Outcome: Ongoing     Problem: Safety:  Goal: Free from accidental physical injury  Description: Free from accidental physical injury  4/27/2021 0920 by Candy Mendoza RN  Outcome: Ongoing  4/27/2021 0243 by Kathleen Alan RN  Outcome: Ongoing  Goal: Free from intentional harm  Description: Free from intentional harm  4/27/2021 0920 by Candy Mendoza RN  Outcome: Ongoing  4/27/2021 0243 by Kathleen Alan RN  Outcome: Ongoing     Problem: Daily Care:  Goal: Daily care needs are met  Description: Daily care needs are met  4/27/2021 0920 by Candy Mendoza RN  Outcome: Ongoing  4/27/2021 0243 by Kathleen Alan RN  Outcome: Ongoing     Problem: Pain:  Goal: Patient's pain/discomfort is manageable  Description: Patient's pain/discomfort is manageable  4/27/2021 0920 by aCndy Mendoza RN  Outcome: Ongoing  4/27/2021 0243 by Kathleen Alan RN  Outcome: Ongoing     Problem: Skin Integrity:  Goal: Skin integrity will stabilize  Description: Skin integrity will stabilize  4/27/2021 0920 by Candy Mendoza RN  Outcome: Ongoing  4/27/2021 0243 by Kathleen Alan RN  Outcome: Ongoing     Problem: Discharge Planning:  Goal: Patients continuum of care needs are met  Description: Patients continuum of care needs are met  4/27/2021 0920 by Candy Mendoza RN  Outcome: Ongoing  4/27/2021 0243 by Kathleen Alan RN  Outcome: Ongoing     Problem: Cardiac:  Goal: Ability to maintain vital signs within normal range will improve  Description: Ability to maintain vital signs within normal range will improve  4/27/2021 0920 by Candy Mendoza RN  Outcome: Ongoing  4/27/2021 0243 by Kathleen Alan RN  Outcome: Ongoing  Goal: Cardiovascular alteration will improve  Description:

## 2021-04-27 NOTE — PROGRESS NOTES
[facility]  Office: [address], DO, Edislin Ganser, DO, Sarah Junito, DO, Hira Rivera Blood, DO, Carol Braden MD, Mel Finnegan MD, Arthur Davis MD, Parmjit Frazier MD, Sergei East MD, Aftab Granger MD, Maria E Pineda MD, France Weiss MD, Kalen Martinez, DO, Buddy Nick MD, Dm Neal, DO, Sheri Campbell MD,  Minesh Perez, DO, Magdaleno Gilbert MD, Mohan Oconnell MD, Philippe Araujo MD, Tina Wade MD, Emeli Campbell, Bettina Chun, CNP, Maribel Rogers, CNP, Zane Marsh, CNS, Leticia Moy, CNP, Emily Jones, CNP, Pawan Way, CNP, Bekah Naqvi, CNP, Brigido Kimbrough, CNP, MARNI MarceloC, Rashi Hernandez, [facility], Hermes Galo, CNP, Ariela bAad, CNP, Susan Gamez, CNP, Maik Valencia, CNP, Henna Perry, CNP, Monica Casillas, [address]    Progress Note    4/27/2021    11:37 AM    Name:   Michoacano Waggoner  MRN:     [de-identified]     Acct:      [de-identified]   Room:   [address] [place] [address]-[facility] Day:  1  Admit Date:  4/25/2021  8:19 PM    PCP:   Aishwarya Piña DO  Code Status:  Full Code    Subjective:     C/C: Dizziness  Chief Complaint   Patient presents with    Emesis     onset at 1700    Dizziness     Interval History Status: not changed. Patient resting in chair. She complains of dizziness with movement. Nursing reports she has had some emesis with drinking water today. She has no complaints of headache, shortness of breath, abdominal pain, complains of chest pressure. Orthostatics reported by nursing to be negative. .  She continues to have nausea. Patient was up with physical therapy and complained of feeling as the room was tilting and lost her balance. Patient did not sustain a fall and was not assisted to the floor-she was assisted to a wheelchair without incident.     Brief History:     Patient is a 62-year-old non-/non- female who presented to the ED with emesis and dizziness. She was admitted to the hospital for the management of dizziness. While in the ED she complained of lightheadedness, dizziness, nausea and vomiting that began around 5 PM.  She also complained of feeling somewhat confused. CT of the head and CTA of the head neck were negative for any acute findings although there was mention of chronic bilateral maxillary and ethmoid sinus disease. Troponins have been negative. While in the ED she received IV hydration, Zofran and IV Rocephin. Her nausea and dizziness persisted despite receiving Phenergan and other antiemetics. 2D echo was completed and revealed an EF of approximately 61%. No significant valvular or pericardial disease was seen. MRI of the brain was completed and revealed an acute left cerebellar ischemic stroke, evidence of old right cerebellar stroke. Neuro was consulted and started patient on Plavix, low-dose aspirin for 3 weeks and then patient will be on low-dose aspirin daily for the long-term. Lipitor 20 mg nightly was also started. Neuro is also recommending discharging the patient on 30-day Holter monitor to rule out any cardiac dysrhythmias or atrial fibrillation. Patient has working with PT and OT as able. KUB was completed per family request and revealed no evidence for obstruction, although calcific densities noted in the left side of the pelvis may represent retained contrast or calcifications or leiomyomas. Review of Systems:     Constitutional:  negative for chills, fevers, sweats  Respiratory:  negative for cough, dyspnea on exertion, shortness of breath, wheezing  Cardiovascular:  negative for chest pain, + chest pressure, no lower extremity edema, palpitations  Gastrointestinal:  negative for abdominal pain, constipation, diarrhea,  Vomiting, + nausea  Neurological:  negative for headache, + dizziness    Medications:      Allergies:  No Known Allergies    Current Meds:   Scheduled Meds:    pantoprazole  40 mg Intravenous Daily    vitamin D  50,000 Units Oral Weekly    scopolamine  1 patch Transdermal Q72H    sodium chloride flush  5-40 mL Intravenous 2 times per day    aspirin  81 mg Oral Daily    enoxaparin  40 mg Subcutaneous Daily    clopidogrel  75 mg Oral Daily    atorvastatin  20 mg Oral Nightly    [Held by provider] metoclopramide  10 mg Intravenous Q6H     Continuous Infusions:    sodium chloride      sodium chloride 50 mL/hr at 21 1156    dextrose       PRN Meds: ipratropium-albuterol, sodium chloride flush, sodium chloride, promethazine **OR** ondansetron, acetaminophen **OR** acetaminophen, polyethylene glycol, glucose, dextrose, glucagon (rDNA), dextrose, meclizine, metoprolol, prochlorperazine, sodium chloride flush    Data:     Past Medical History:   has a past medical history of Hypertension, Nephropathy, and ANTIONETTE on CPAP. Social History:   reports that she has never smoked. She has never used smokeless tobacco. She reports that she does not drink alcohol or use drugs. Family History: History reviewed. No pertinent family history. Vitals:  BP (!) 148/69   Pulse 76   Temp 98 °F (36.7 °C) (Oral)   Resp 18   Ht 5' 2\" (1.575 m)   Wt 193 lb 9 oz (87.8 kg)   SpO2 99%   BMI 35.40 kg/m²   Temp (24hrs), Av.6 °F (37 °C), Min:98 °F (36.7 °C), Max:98.8 °F (37.1 °C)    Recent Labs     21  1121   POCGLU 134* 120* 132*       I/O (24Hr):     Intake/Output Summary (Last 24 hours) at 2021 1137  Last data filed at 2021 1037  Gross per 24 hour   Intake 772 ml   Output 101 ml   Net 671 ml       Labs:  Hematology:  Recent Labs     21  0600   WBC 8.4 8.8   RBC 5.20 4.60   HGB 14.9 13.1   HCT 45.7 40.9   MCV 88.0 88.9   MCH 28.7 28.5   MCHC 32.6 32.0   RDW 13.3 13.5    240   MPV 9.6 9.5   SEDRATE 35*  --    CRP <3.0  --    DDIMER 0.42  --      Chemistry:  Recent Labs     21  0158 04/26/21  0600 04/27/21  0507     --   --   --  144   K 3.7  --   --   --  4.0     --   --   --  111*   CO2 25  --   --   --  27   GLUCOSE 173*  --   --   --  138*   BUN 17  --   --   --  15   CREATININE 0.58  --   --   --  0.52   ANIONGAP 12  --   --   --  6*   LABGLOM >60  --   --   --  >60   GFRAA >60  --   --   --  >60   CALCIUM 9.3  --   --   --  8.4*   PROBNP 33  --   --   --   --    TROPHS <6   < > <6 6 7    < > = values in this interval not displayed. Recent Labs     04/25/21 2040 04/25/21 2047 04/26/21  0600 04/26/21  1211 04/26/21  1944 04/27/21  1121   PROT 6.4  --   --   --   --   --    LABALBU 3.3*  --   --   --   --   --    TSH  --   --  1.45  --   --   --    AST 16  --   --   --   --   --    ALT 12  --   --   --   --   --    ALKPHOS 65  --   --   --   --   --    BILITOT 0.40  --   --   --   --   --    AMYLASE 70  --   --   --   --   --    LIPASE 42  --   --   --   --   --    CHOL  --   --   --  258*  --   --    HDL  --   --   --  67  --   --    LDLCHOLESTEROL  --   --   --  162*  --   --    CHOLHDLRATIO  --   --   --  3.9  --   --    TRIG  --   --   --  143  --   --    VLDL  --   --   --  NOT REPORTED  --   --    POCGLU  --  134*  --   --  120* 132*       Radiology:  Xr Abdomen (kub) (single Ap View)    Result Date: 4/26/2021  Unremarkable bowel gas pattern. No evidence for obstruction. Calcific densities noted in the left side of pelvis may represent retained contrast versus calcifications in the noted raising it and leiomyomas of the etiologies not excluded. Ct Head Wo Contrast    Result Date: 4/25/2021  No acute intracranial abnormality. Bilateral maxillary and ethmoid sinus disease. Xr Chest Portable    Result Date: 4/25/2021  No acute process. Cta Head Neck W Contrast    Result Date: 4/25/2021  Mild atherosclerotic disease.   No large vessel occlusion or hemodynamic stenosis identified     Mri Brain Wo Contrast    Result Date: 4/26/2021  Small focus of acute/subacute ischemia in the left cerebellar hemisphere. Mild chronic sinusitis in the paranasal sinuses. Physical Examination:       General appearance:  alert, cooperative and no distress  Mental Status:  oriented to person, place and time and normal affect  Lungs: Inspiratory wheezes to left lung fields, inspiratory wheezes to right mid and lower lobes not as pronounced as left side, normal effort  Heart:  regular rate and rhythm, no murmur  Abdomen:  soft, nontender, nondistended, normal bowel sounds, no masses, hepatomegaly, splenomegaly  Extremities:  no edema, redness, tenderness in the calves  Skin:  no gross lesions, rashes, induration    Assessment:     Hospital Problems           Last Modified POA    * (Principal) Dizzy 4/26/2021 Yes    Hypertension 4/26/2021 Yes    Hypercholesterolemia 4/26/2021 Yes    Osteoarthritis (Chronic) 4/26/2021 Yes    Overview Signed 4/26/2021  7:23 AM by FRANKY Little CNP     knees and back pain         Migraine without aura and without status migrainosus, not intractable (Chronic) 4/26/2021 Yes    Sleep apnea (Chronic) 4/26/2021 Yes    Overview Addendum 4/26/2021 10:15 AM by FRANKY Little CNP     She does wear cpap         Acute CVA (cerebrovascular accident) ([facility] [address] 4/26/2021 Yes    Non-intractable vomiting 4/26/2021 Yes    Chronic maxillary sinusitis 4/26/2021 Yes    Vitamin D deficiency 4/27/2021 Yes          Plan:     1. Dizziness/acute CVA: Dizziness suspected to result of the acute CVA. Control blood pressure. Plavix and low-dose aspirin as ordered. Neurology following. Appreciate input. Fall precautions. PT/OT. Discharge plan for acute inpatient rehab-noted referral sent to [facility].  2. Hypertension: Monitor blood pressure as ordered. Orthostatics negative. Blood pressure reasonably controlled at this time. 3. Hypercholesterolemia: Continue Lipitor  4. Non-intractable vomiting/nausea: Antiemetics as ordered. Scopolamine patch is ordered. Monitor for worsening dizziness. Discontinue scopolamine patch if dizziness worsens. 5. Sleep apnea: Supplemental O2 to keep SPO2 greater than 92% overnight. Patient noted to be wheezing. DuoNeb aerosol treatments every 4 hours as needed as needed for shortness of breath/wheezing. 6. Vitamin D deficiency: Weekly vitamin D supplementation as ordered x10 weeks. Recheck vitamin D levels as outpatient in 3 months. Discharge planning in progress for patient to go to inpatient acute rehab.         FRANKY Pedarza NP  4/27/2021  11:37 AM

## 2021-04-28 LAB
ANION GAP SERPL CALCULATED.3IONS-SCNC: 4 MMOL/L (ref 9–17)
BUN BLDV-MCNC: 15 MG/DL (ref 8–23)
BUN/CREAT BLD: ABNORMAL (ref 9–20)
CALCIUM SERPL-MCNC: 8.5 MG/DL (ref 8.6–10.4)
CHLORIDE BLD-SCNC: 111 MMOL/L (ref 98–107)
CO2: 27 MMOL/L (ref 20–31)
CREAT SERPL-MCNC: 0.57 MG/DL (ref 0.5–0.9)
CREATININE URINE: 164 MG/DL (ref 28–217)
EKG ATRIAL RATE: 64 BPM
EKG ATRIAL RATE: 70 BPM
EKG P AXIS: 29 DEGREES
EKG P AXIS: 85 DEGREES
EKG P-R INTERVAL: 170 MS
EKG P-R INTERVAL: 178 MS
EKG Q-T INTERVAL: 380 MS
EKG Q-T INTERVAL: 418 MS
EKG QRS DURATION: 86 MS
EKG QRS DURATION: 88 MS
EKG QTC CALCULATION (BAZETT): 410 MS
EKG QTC CALCULATION (BAZETT): 431 MS
EKG R AXIS: 11 DEGREES
EKG R AXIS: 13 DEGREES
EKG T AXIS: 108 DEGREES
EKG T AXIS: 31 DEGREES
EKG VENTRICULAR RATE: 64 BPM
EKG VENTRICULAR RATE: 70 BPM
ESTIMATED AVERAGE GLUCOSE: 117 MG/DL
GFR AFRICAN AMERICAN: >60 ML/MIN
GFR NON-AFRICAN AMERICAN: >60 ML/MIN
GFR SERPL CREATININE-BSD FRML MDRD: ABNORMAL ML/MIN/{1.73_M2}
GFR SERPL CREATININE-BSD FRML MDRD: ABNORMAL ML/MIN/{1.73_M2}
GLUCOSE BLD-MCNC: 105 MG/DL (ref 65–105)
GLUCOSE BLD-MCNC: 112 MG/DL (ref 65–105)
GLUCOSE BLD-MCNC: 78 MG/DL (ref 65–105)
GLUCOSE BLD-MCNC: 92 MG/DL (ref 70–99)
HBA1C MFR BLD: 5.7 % (ref 4–6)
LV EF: 63 %
LVEF MODALITY: NORMAL
POTASSIUM SERPL-SCNC: 4.2 MMOL/L (ref 3.7–5.3)
SARS-COV-2, RAPID: NOT DETECTED
SODIUM BLD-SCNC: 142 MMOL/L (ref 135–144)
SPECIMEN DESCRIPTION: NORMAL
TOTAL PROTEIN, URINE: 142 MG/DL

## 2021-04-28 PROCEDURE — 97110 THERAPEUTIC EXERCISES: CPT

## 2021-04-28 PROCEDURE — 99232 SBSQ HOSP IP/OBS MODERATE 35: CPT | Performed by: FAMILY MEDICINE

## 2021-04-28 PROCEDURE — 82947 ASSAY GLUCOSE BLOOD QUANT: CPT

## 2021-04-28 PROCEDURE — 1210000000 HC MED SURG R&B

## 2021-04-28 PROCEDURE — 80048 BASIC METABOLIC PNL TOTAL CA: CPT

## 2021-04-28 PROCEDURE — 6370000000 HC RX 637 (ALT 250 FOR IP): Performed by: NURSE PRACTITIONER

## 2021-04-28 PROCEDURE — 93306 TTE W/DOPPLER COMPLETE: CPT

## 2021-04-28 PROCEDURE — B246ZZZ ULTRASONOGRAPHY OF RIGHT AND LEFT HEART: ICD-10-PCS | Performed by: INTERNAL MEDICINE

## 2021-04-28 PROCEDURE — 94640 AIRWAY INHALATION TREATMENT: CPT

## 2021-04-28 PROCEDURE — 97116 GAIT TRAINING THERAPY: CPT

## 2021-04-28 PROCEDURE — 2500000003 HC RX 250 WO HCPCS: Performed by: NURSE PRACTITIONER

## 2021-04-28 PROCEDURE — 97112 NEUROMUSCULAR REEDUCATION: CPT

## 2021-04-28 PROCEDURE — 6370000000 HC RX 637 (ALT 250 FOR IP): Performed by: INTERNAL MEDICINE

## 2021-04-28 PROCEDURE — 87635 SARS-COV-2 COVID-19 AMP PRB: CPT

## 2021-04-28 PROCEDURE — 6360000002 HC RX W HCPCS: Performed by: NURSE PRACTITIONER

## 2021-04-28 PROCEDURE — 97535 SELF CARE MNGMENT TRAINING: CPT

## 2021-04-28 PROCEDURE — 2700000000 HC OXYGEN THERAPY PER DAY

## 2021-04-28 PROCEDURE — 2580000003 HC RX 258: Performed by: NURSE PRACTITIONER

## 2021-04-28 PROCEDURE — C9113 INJ PANTOPRAZOLE SODIUM, VIA: HCPCS | Performed by: NURSE PRACTITIONER

## 2021-04-28 PROCEDURE — 36415 COLL VENOUS BLD VENIPUNCTURE: CPT

## 2021-04-28 PROCEDURE — 99222 1ST HOSP IP/OBS MODERATE 55: CPT | Performed by: INTERNAL MEDICINE

## 2021-04-28 PROCEDURE — 97530 THERAPEUTIC ACTIVITIES: CPT

## 2021-04-28 RX ORDER — LOSARTAN POTASSIUM 25 MG/1
25 TABLET ORAL DAILY
Status: DISCONTINUED | OUTPATIENT
Start: 2021-04-28 | End: 2021-04-29

## 2021-04-28 RX ORDER — BUDESONIDE AND FORMOTEROL FUMARATE DIHYDRATE 80; 4.5 UG/1; UG/1
2 AEROSOL RESPIRATORY (INHALATION) 2 TIMES DAILY
Status: DISCONTINUED | OUTPATIENT
Start: 2021-04-28 | End: 2021-04-29 | Stop reason: HOSPADM

## 2021-04-28 RX ORDER — ALBUTEROL SULFATE 90 UG/1
2 AEROSOL, METERED RESPIRATORY (INHALATION) EVERY 6 HOURS PRN
Status: DISCONTINUED | OUTPATIENT
Start: 2021-04-28 | End: 2021-04-29 | Stop reason: HOSPADM

## 2021-04-28 RX ADMIN — ATORVASTATIN CALCIUM 20 MG: 10 TABLET, FILM COATED ORAL at 21:13

## 2021-04-28 RX ADMIN — CLOPIDOGREL BISULFATE 75 MG: 75 TABLET ORAL at 08:56

## 2021-04-28 RX ADMIN — PANTOPRAZOLE SODIUM 40 MG: 40 INJECTION, POWDER, FOR SOLUTION INTRAVENOUS at 08:57

## 2021-04-28 RX ADMIN — BUDESONIDE AND FORMOTEROL FUMARATE DIHYDRATE 2 PUFF: 80; 4.5 AEROSOL RESPIRATORY (INHALATION) at 20:18

## 2021-04-28 RX ADMIN — SODIUM CHLORIDE, PRESERVATIVE FREE 10 ML: 5 INJECTION INTRAVENOUS at 08:57

## 2021-04-28 RX ADMIN — METOPROLOL TARTRATE 2.5 MG: 5 INJECTION INTRAVENOUS at 15:08

## 2021-04-28 RX ADMIN — ASPIRIN 81 MG: 81 TABLET, CHEWABLE ORAL at 08:56

## 2021-04-28 RX ADMIN — LOSARTAN POTASSIUM 25 MG: 25 TABLET, FILM COATED ORAL at 14:57

## 2021-04-28 RX ADMIN — SODIUM CHLORIDE, PRESERVATIVE FREE 10 ML: 5 INJECTION INTRAVENOUS at 21:15

## 2021-04-28 RX ADMIN — SODIUM CHLORIDE: 9 INJECTION, SOLUTION INTRAVENOUS at 09:11

## 2021-04-28 RX ADMIN — ENOXAPARIN SODIUM 40 MG: 40 INJECTION SUBCUTANEOUS at 08:56

## 2021-04-28 RX ADMIN — SODIUM CHLORIDE, PRESERVATIVE FREE 5 ML: 5 INJECTION INTRAVENOUS at 15:09

## 2021-04-28 ASSESSMENT — ENCOUNTER SYMPTOMS
ABDOMINAL PAIN: 0
NAUSEA: 1
BLOOD IN STOOL: 0
COUGH: 0
SHORTNESS OF BREATH: 0
CHEST TIGHTNESS: 0
VOMITING: 0
CONSTIPATION: 0
DIARRHEA: 0
WHEEZING: 0

## 2021-04-28 ASSESSMENT — PAIN SCALES - GENERAL: PAINLEVEL_OUTOF10: 0

## 2021-04-28 NOTE — CONSULTS
Nephrology Consult Note    Reason for Consult: Proteinuria   requesting Physician: Dr. Kae Agarwal  Chief Complaint: Patient was admitted with stroke  History Obtained From: Patient and chart review     history of Present Illness: This is a 78 y.o. female who has a past medical history significant for hypertension. She takes metoprolol and losartan for her blood pressure control and recently Lasix was added due to edema. Patient also has a history of obstructive sleep apnea and on CPAP. Patient is very compliant to CPAP. Patient presented to ER with lightheadedness and incoordination of movement. Alex Adames Her blood pressure was elevated at 170/70. A CT scan was negative for acute finding but subsequently MRI showed acute cerebellar stroke. She was started on Plavix with aspirin. The reason nephrology was consulted because of proteinuria. Patient states that in last December all of a sudden she noticed foamy and frothy urine. She also noticed increase in swelling of lower extremities. She was seen by her primary care and then referred to [facility] for nephrological evaluation. At that time according to patient her protein excretion was close to 6 g. She underwent kidney biopsy which shows membranous nephropathy with negative BERKLEY 2 R-. Due to scheduling issues she missed her follow-up appointment. According to patient there is a plan as far as she knows to see blood doctor and bone marrow biopsy. Patient denies any consumption of NSAIDs. There is no history of connective tissue disorder. Patient denies consumption of any herbal natural or holistic medication. Patient does not consume Motrin Aleve or ibuprofen. There is no recent history of parasitic infection  While in hospital I spot urine for protein and creatinine with was performed, which shows protein excretion 800 mg/g of creatinine.   Her serum albumin was 3.3    Past Medical History:        Diagnosis Date    Hypertension     Nephropathy     ANTIONETTE on CPAP        Past Surgical History:        Procedure Laterality Date     SECTION         Current Medications:    budesonide-formoterol (SYMBICORT) 80-4.5 MCG/ACT inhaler 2 puff, BID  albuterol sulfate  (90 Base) MCG/ACT inhaler 2 puff, Q6H PRN  pantoprazole (PROTONIX) injection 40 mg, Daily  vitamin D (ERGOCALCIFEROL) capsule 50,000 Units, Weekly  scopolamine (TRANSDERM-SCOP) transdermal patch 1 patch, Q72H  sodium chloride flush 0.9 % injection 5-40 mL, 2 times per day  sodium chloride flush 0.9 % injection 5-40 mL, PRN  0.9 % sodium chloride infusion, PRN  promethazine (PHENERGAN) tablet 12.5 mg, Q6H PRN    Or  ondansetron (ZOFRAN) injection 4 mg, Q6H PRN  acetaminophen (TYLENOL) tablet 650 mg, Q6H PRN    Or  acetaminophen (TYLENOL) suppository 650 mg, Q6H PRN  polyethylene glycol (GLYCOLAX) packet 17 g, Daily PRN  aspirin chewable tablet 81 mg, Daily  enoxaparin (LOVENOX) injection 40 mg, Daily  0.9 % sodium chloride infusion, Continuous  glucose (GLUTOSE) 40 % oral gel 15 g, PRN  dextrose 50 % IV solution, PRN  glucagon (rDNA) injection 1 mg, PRN  dextrose 5 % solution, PRN  clopidogrel (PLAVIX) tablet 75 mg, Daily  atorvastatin (LIPITOR) tablet 20 mg, Nightly  meclizine (ANTIVERT) tablet 25 mg, TID PRN  metoprolol (LOPRESSOR) injection 2.5 mg, 4x Daily PRN  [Held by provider] metoclopramide (REGLAN) injection 10 mg, Q6H  prochlorperazine (COMPAZINE) injection 10 mg, Q6H PRN  sodium chloride flush 0.9 % injection 10 mL, PRN        Allergies:  Patient has no known allergies.     Social History:   Social History     Socioeconomic History    Marital status:      Spouse name: Not on file    Number of children: Not on file    Years of education: Not on file    Highest education level: Not on file   Occupational History    Not on file   Social Needs    Financial resource strain: Not on file    Food insecurity     Worry: Not on file     Inability: Not on file   [place] Transportation needs     Medical: Not on file     Non-medical: Not on file   Tobacco Use    Smoking status: Never Smoker    Smokeless tobacco: Never Used   Substance and Sexual Activity    Alcohol use: Never     Frequency: Never    Drug use: Never    Sexual activity: Not on file   Lifestyle    Physical activity     Days per week: Not on file     Minutes per session: Not on file    Stress: Not on file   Relationships    Social connections     Talks on phone: Not on file     Gets together: Not on file     Attends [employer] service: Not on file     Active member of club or organization: Not on file     Attends meetings of clubs or organizations: Not on file     Relationship status: Not on file    Intimate partner violence     Fear of current or ex partner: Not on file     Emotionally abused: Not on file     Physically abused: Not on file     Forced sexual activity: Not on file   Other Topics Concern    Not on file   Social History Narrative    Not on file       Family History:   History reviewed. No pertinent family history. Review of Systems:    Constitutional: No fever or chills   HEENT:  No headache or sore throat. Cardiac:  No chest pain or pain PND  Chest:   No cough or wheezing  Abdomen:  No abdominal pain nausea or vomiting  Neuro:  Neurological symptoms as described above  Skin:   No rashes, no itching. :   No hematuria, no pyuria, no dysuria, no flank pain.   Extremities:  Improvement in leg swelling since she is spending most of her time in bed    Objective:  CURRENT TEMPERATURE:  Temp: 98.5 °F (36.9 °C)  MAXIMUM TEMPERATURE OVER 24HRS:  Temp (24hrs), Av.5 °F (36.9 °C), Min:98 °F (36.7 °C), Max:99.1 °F (37.3 °C)    CURRENT RESPIRATORY RATE:  Resp: 16  CURRENT PULSE:  Pulse: 62  CURRENT BLOOD PRESSURE:  BP: (!) 147/67  24HR BLOOD PRESSURE RANGE:  Systolic (72NDY), JKW:488 , Min:113 , FBT:764   ; Diastolic (08AVC), XHD:78, Min:60, Max:78    24HR INTAKE/OUTPUT:      Intake/Output Summary (Last 24 hours) at 4/28/2021 1156  Last data filed at 4/27/2021 1747  Gross per 24 hour   Intake 270 ml   Output    Net 270 ml     Patient Vitals for the past 96 hrs (Last 3 readings):   Weight   04/28/21 0427 192 lb 14.4 oz (87.5 kg)   04/27/21 0337 193 lb 9 oz (87.8 kg)   04/25/21 2023 180 lb (81.6 kg)     Physical Exam:  General appearance: Awake and alert x3  Skin: Warm to touch and no erythema  Eyes: [place] was pink   Neck: No carotid bruit or lymphadenopathy   pulmonary: No wheezing  Cardiovascular: 1 and S2 audible no S3 abdomen: soft nontender, bowel sounds present, no organomegaly,  no ascites  Extremities: 1+ edema  Labs:   CBC:  Recent Labs     04/25/21 2040 04/26/21  0600   WBC 8.4 8.8   RBC 5.20 4.60   HGB 14.9 13.1   HCT 45.7 40.9   MCV 88.0 88.9   MCH 28.7 28.5   MCHC 32.6 32.0   RDW 13.3 13.5    240   MPV 9.6 9.5      BMP:   Recent Labs     04/25/21 2040 04/27/21  0507 04/28/21  0609    144 142   K 3.7 4.0 4.2    111* 111*   CO2 25 27 27   BUN 17 15 15   CREATININE 0.58 0.52 0.57   GLUCOSE 173* 138* 92   CALCIUM 9.3 8.4* 8.5*    Results for Ori Kincaid (MRN [de-identified]) as of 4/28/2021 12:01   Ref. Range 4/27/2021 06:16   Creatinine, Ur Latest Ref Range: 28.0 - 217.0 mg/dL 164.0   Total Protein, Urine Latest Units: mg/dL 142     Radiology:  Reviewed as available. Assessment:  1. Acute ischemic cerebral stroke  2. Proteinuria due to membranous nephropathy, biopsy versus BERKLEY 2R negative. Her peak proteinuria about 6 g. Based on most recent protein to creatinine ratio her proteinuria is less than a gram with serum albumin of 3.3  3. Hypertension blood pressure is under fair control  4. Hyperlipidemia  Plan:  1. Will start losartan 25 mg once a day as a antiproteinuric effect  2. Repeat BMP for potassium and creatinine 5 days after starting medication  3. Follow-up with primary nephrologist at [facility]  4.   Discontinue IV fluids   thank you for the consultation. Please do not hesitate to call with questions.     Electronically signed by Piotr Francisco MD on 4/28/2021 at 11:56 AM

## 2021-04-28 NOTE — PROGRESS NOTES
complained of feeling somewhat confused. CT of the head and CTA of the head neck were negative for any acute findings although there was mention of chronic bilateral maxillary and ethmoid sinus disease. Troponins have been negative. While in the ED she received IV hydration, Zofran and IV Rocephin. Her nausea and dizziness persisted despite receiving Phenergan and other antiemetics. 2D echo was completed and revealed an EF of approximately 61%. No significant valvular or pericardial disease was seen. MRI of the brain was completed and revealed an acute left cerebellar ischemic stroke, evidence of old right cerebellar stroke. Neuro was consulted and started patient on Plavix, low-dose aspirin for 3 weeks and then patient will be on low-dose aspirin daily for the long-term. Lipitor 20 mg nightly was also started. Neuro is also recommending discharging the patient on 30-day Holter monitor to rule out any cardiac dysrhythmias or atrial fibrillation. Patient has working with PT and OT as able. KUB was completed per family request and revealed no evidence for obstruction, although calcific densities noted in the left side of the pelvis may represent retained contrast or calcifications or leiomyomas. Review of Systems:     Review of Systems   Constitutional: Positive for activity change. Negative for chills, diaphoresis and fever. HENT: Negative for congestion. Eyes: Negative for visual disturbance. Respiratory: Negative for cough, chest tightness, shortness of breath and wheezing. Cardiovascular: Negative for chest pain, palpitations and leg swelling. Gastrointestinal: Positive for nausea. Negative for abdominal pain, blood in stool, constipation, diarrhea and vomiting. Genitourinary: Negative for difficulty urinating. Neurological: Positive for dizziness and weakness. Negative for light-headedness, numbness and headaches. All other systems reviewed and are negative.       Medications: Allergies:  No Known Allergies    Current Meds:   Scheduled Meds:    budesonide-formoterol  2 puff Inhalation BID    losartan  25 mg Oral Daily    pantoprazole  40 mg Intravenous Daily    vitamin D  50,000 Units Oral Weekly    scopolamine  1 patch Transdermal Q72H    sodium chloride flush  5-40 mL Intravenous 2 times per day    aspirin  81 mg Oral Daily    enoxaparin  40 mg Subcutaneous Daily    clopidogrel  75 mg Oral Daily    atorvastatin  20 mg Oral Nightly    [Held by provider] metoclopramide  10 mg Intravenous Q6H     Continuous Infusions:    sodium chloride      dextrose       PRN Meds: albuterol sulfate HFA, sodium chloride flush, sodium chloride, promethazine **OR** ondansetron, acetaminophen **OR** acetaminophen, polyethylene glycol, glucose, dextrose, glucagon (rDNA), dextrose, meclizine, metoprolol, prochlorperazine, sodium chloride flush    Data:     Past Medical History:   has a past medical history of Hypertension, Nephropathy, and ANTIONETTE on CPAP. Social History:   reports that she has never smoked. She has never used smokeless tobacco. She reports that she does not drink alcohol or use drugs. Family History: History reviewed. No pertinent family history. Vitals:  BP (!) 164/80 Comment: pt just returned from PT/OT therapy room  Pulse 64   Temp 97.6 °F (36.4 °C) (Axillary) Comment (Src): pt talking on phone  Resp 18   Ht 5' 2\" (1.575 m)   Wt 192 lb 14.4 oz (87.5 kg)   SpO2 96%   BMI 35.28 kg/m²   Temp (24hrs), Av.4 °F (36.9 °C), Min:97.6 °F (36.4 °C), Max:99.1 °F (37.3 °C)    Recent Labs     21  0748 21  1139 21  1734   POCGLU 93 78 105 112*       I/O (24Hr):     Intake/Output Summary (Last 24 hours) at 2021 1749  Last data filed at 2021 1315  Gross per 24 hour   Intake 1109.34 ml   Output    Net 1109.34 ml       Labs:  Hematology:  Recent Labs     21  2040 21  0600   WBC 8.4 8.8   RBC 5.20 4.60   HGB 14.9 13.1   HCT 45.7 40.9   MCV 88.0 88.9   MCH 28.7 28.5   MCHC 32.6 32.0   RDW 13.3 13.5    240   MPV 9.6 9.5   SEDRATE 35*  --    CRP <3.0  --    DDIMER 0.42  --      Chemistry:  Recent Labs     04/25/21 2040 04/25/21 2040 04/26/21  0155 04/26/21  0600 04/27/21  0507 04/28/21  0609     --   --   --  144 142   K 3.7  --   --   --  4.0 4.2     --   --   --  111* 111*   CO2 25  --   --   --  27 27   GLUCOSE 173*  --   --   --  138* 92   BUN 17  --   --   --  15 15   CREATININE 0.58  --   --   --  0.52 0.57   ANIONGAP 12  --   --   --  6* 4*   LABGLOM >60  --   --   --  >60 >60   GFRAA >60  --   --   --  >60 >60   CALCIUM 9.3  --   --   --  8.4* 8.5*   PROBNP 33  --   --   --   --   --    TROPHS <6   < > <6 6 7  --     < > = values in this interval not displayed.      Recent Labs     04/25/21 2040 04/25/21 2040 04/26/21 0600 04/26/21  1211 04/26/21  1211 04/27/21  0507 04/27/21  1121 04/27/21  1602 04/27/21 2012 04/28/21  0748 04/28/21  1139 04/28/21  1734   PROT 6.4  --   --   --   --   --   --   --   --   --   --   --    LABALBU 3.3*  --   --   --   --   --   --   --   --   --   --   --    LABA1C  --   --   --   --   --  5.7  --   --   --   --   --   --    TSH  --   --  1.45  --   --   --   --   --   --   --   --   --    AST 16  --   --   --   --   --   --   --   --   --   --   --    ALT 12  --   --   --   --   --   --   --   --   --   --   --    ALKPHOS 65  --   --   --   --   --   --   --   --   --   --   --    BILITOT 0.40  --   --   --   --   --   --   --   --   --   --   --    AMYLASE 70  --   --   --   --   --   --   --   --   --   --   --    LIPASE 42  --   --   --   --   --   --   --   --   --   --   --    CHOL  --   --   --  258*  --   --   --   --   --   --   --   --    HDL  --   --   --  67  --   --   --   --   --   --   --   --    LDLCHOLESTEROL  --   --   --  162*  --   --   --   --   --   --   --   --    CHOLHDLRATIO  --   --   --  3.9  --   --   --   --   --   --   --   --    TRIG  --   -- --  143  --   --   --   --   --   --   --   --    VLDL  --   --   --  NOT REPORTED  --   --   --   --   --   --   --   --    POCGLU  --    < >  --   --    < >  --  132* 107* 93 78 105 112*    < > = values in this interval not displayed. ABG:No results found for: POCPH, PHART, PH, POCPCO2, EIA9ZHE, PCO2, POCPO2, PO2ART, PO2, POCHCO3, LMV6WJY, HCO3, NBEA, PBEA, BEART, BE, THGBART, THB, PDU7DAW, AYYH8JZR, L3KSIJVW, O2SAT, FIO2  No results found for: SPECIAL  No results found for: CULTURE    Radiology:  Xr Abdomen (kub) (single Ap View)    Result Date: 4/26/2021  Unremarkable bowel gas pattern. No evidence for obstruction. Calcific densities noted in the left side of pelvis may represent retained contrast versus calcifications in the noted raising it and leiomyomas of the etiologies not excluded. Ct Head Wo Contrast    Result Date: 4/25/2021  No acute intracranial abnormality. Bilateral maxillary and ethmoid sinus disease. Xr Chest Portable    Result Date: 4/25/2021  No acute process. Cta Head Neck W Contrast    Result Date: 4/25/2021  Mild atherosclerotic disease. No large vessel occlusion or hemodynamic stenosis identified     Mri Brain Wo Contrast    Result Date: 4/26/2021  Small focus of acute/subacute ischemia in the left cerebellar hemisphere. Mild chronic sinusitis in the paranasal sinuses. Physical Examination:     Physical Exam  Vitals signs and nursing note reviewed. Constitutional:       General: She is not in acute distress. HENT:      Head: Normocephalic and atraumatic. Eyes:      Conjunctiva/sclera: Conjunctivae normal.      Pupils: Pupils are equal, round, and reactive to light. Cardiovascular:      Rate and Rhythm: Normal rate and regular rhythm. Heart sounds: No murmur. Pulmonary:      Effort: Pulmonary effort is normal. No accessory muscle usage or respiratory distress. Breath sounds: No stridor. No decreased breath sounds, wheezing, rhonchi or rales. Abdominal:      General: Bowel sounds are normal. There is no distension. Palpations: Abdomen is soft. Abdomen is not rigid. Tenderness: There is no abdominal tenderness. There is no guarding. Musculoskeletal:         General: No tenderness. Skin:     General: Skin is warm and dry. Findings: No erythema, lesion or rash. Neurological:      Mental Status: She is alert and oriented to person, place, and time. Cranial Nerves: No cranial nerve deficit. Motor: No seizure activity. Comments: Unable to assess gait   Psychiatric:         Speech: Speech normal.         Behavior: Behavior normal. Behavior is cooperative. Assessment:     Hospital Problems           Last Modified POA    * (Principal) Dizzy 4/26/2021 Yes    Hypertension 4/26/2021 Yes    Hypercholesterolemia 4/26/2021 Yes    Osteoarthritis (Chronic) 4/26/2021 Yes    Overview Signed 4/26/2021  7:23 AM by FRANKY Aragon CNP     knees and back pain         Migraine without aura and without status migrainosus, not intractable (Chronic) 4/26/2021 Yes    Sleep apnea (Chronic) 4/26/2021 Yes    Overview Addendum 4/26/2021 10:15 AM by FRANKY Aragon CNP     She does wear cpap         Acute CVA (cerebrovascular accident) ([facility] [address] 4/26/2021 Yes    Non-intractable vomiting 4/26/2021 Yes    Chronic maxillary sinusitis 4/26/2021 Yes    Vitamin D deficiency 4/27/2021 Yes          Plan:       Acute left cerebellar stroke, old right cerebellar stroke:  No LVO  Plavix and aspirin for 3 weeks then only aspirin afterwards and statin. PT/OT. Referral for inpatient rehab at [facility]  Echo unremarkable, negative stress test  Neuro recommend discharge on Holter monitor     Non-intractable nausea and vomiting: Symptomatic management, improving.     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

## 2021-04-28 NOTE — PLAN OF CARE
Problem: Infection:  Goal: Will remain free from infection  Description: Will remain free from infection  Outcome: Ongoing     Problem: Safety:  Goal: Free from accidental physical injury  Description: Free from accidental physical injury  Outcome: Ongoing  Goal: Free from intentional harm  Description: Free from intentional harm  Outcome: Ongoing     Problem: Daily Care:  Goal: Daily care needs are met  Description: Daily care needs are met  Outcome: Ongoing     Problem: Pain:  Goal: Patient's pain/discomfort is manageable  Description: Patient's pain/discomfort is manageable  Outcome: Ongoing     Problem: Skin Integrity:  Goal: Skin integrity will stabilize  Description: Skin integrity will stabilize  Outcome: Ongoing     Problem: Cardiac:  Goal: Ability to maintain vital signs within normal range will improve  Description: Ability to maintain vital signs within normal range will improve  Outcome: Ongoing  Goal: Cardiovascular alteration will improve  Description: Cardiovascular alteration will improve  Outcome: Ongoing     Problem: Health Behavior:  Goal: Will modify at least one risk factor affecting health status  Description: Will modify at least one risk factor affecting health status  Outcome: Ongoing  Goal: Identification of resources available to assist in meeting health care needs will improve  Description: Identification of resources available to assist in meeting health care needs will improve  Outcome: Ongoing     Problem: Physical Regulation:  Goal: Complications related to the disease process, condition or treatment will be avoided or minimized  Description: Complications related to the disease process, condition or treatment will be avoided or minimized  Outcome: Ongoing     Problem: Falls - Risk of:  Goal: Will remain free from falls  Description: Will remain free from falls  Outcome: Ongoing  Goal: Absence of physical injury  Description: Absence of physical injury  Outcome: Ongoing

## 2021-04-28 NOTE — CARE COORDINATION
Call received from Gisselle at [facility] - unable to accept patient d/t no bed availability until mid next-week. Updated patient and family, discussed alternate choices. Request referral to [facility] [place]. [facility]  - referral sent. 1200 call from Andrew Strong at [facility]. with questions - answered. Requesting more clinical faxed and provided son's  phone number. [phone] call from Andrew Strong at [facility] - patient is accepted and can admit tomorrow, will need new COVID swab done PTA. She has already called son and is anticipating that patient will to them by noon time 4/29.  Will fax COVID test results and orders when complete to [phone]

## 2021-04-28 NOTE — PROGRESS NOTES
breathing during sitting balance)    Standing Balance: Contact guard assistance  Standing Balance  Time: ~5mins  Activity: Stand marching in place x15 reps each leg w/RW  Comment: Pt observed with improved balance/coordination secondary to acute/subacute ischemia to left cerebellar hemisphere    Functional Mobility  Functional - Mobility Device: Rolling Walker  Activity: Other(Pt ambulated w/RW recliner chair > couch > recliner chair)  Assist Level: Minimal assistance  Functional Mobility Comments: CGA-min A secondary to reduced balance/coordination        Transfers  Sit to stand: Contact guard assistance(Pt performed CGA sit <> stand transfer from recliner w/RW for assistance)  Stand to sit: Contact guard assistance(Pt performed CGA stand <> sit transfer from couch w/RW for assistance)  Transfer Comments: Pt performed CGA sit <> stand recliner > couch > recliner requiring min verbal and tactile cues to increase safety awareness and decrease impulsivity utilizing RW        Cognition  Overall Cognitive Status: Exceptions  Arousal/Alertness: Delayed responses to stimuli(Pt is more alert and awake today; pt observed minimal delayed responses to stimuli and direction following)  Following Commands: Follows multistep commands with increased time; Follows one step commands with increased time  Safety Judgement: Decreased awareness of need for assistance(Pt observed with improved awareness of requiring min VCs for safety judgement and impulstivity)  Problem Solving: Assistance required to generate solutions;Good awareness of errors made;Assistance required to implement solutions  Insights: Decreased awareness of deficits(Pt observed with improved insights however needs min VCs for awareness of deficits with increased time)  Initiation: Requires cues for some  Sequencing: Requires cues for some  Cognition Comment: Pt required min verbal/tactile cues for slight impulsitity/safety awareness/direction following for sit <>

## 2021-04-28 NOTE — PROGRESS NOTES
Physical Therapy  Facility/Department: [facility] MED SURG ICU  Daily Treatment Note  NAME: Jose Gaines  : 1950  MRN: [id number]    Date of Service: 2021    Discharge Recommendations:  Patient would benefit from continued therapy after discharge   PT Equipment Recommendations  Equipment Needed: (Continue to assess)    Assessment   Body structures, Functions, Activity limitations: Decreased functional mobility ; Decreased strength;Decreased safe awareness;Decreased endurance;Decreased balance;Decreased coordination  Assessment: Pt with impaired mobility requiring min A with RW and mod A without device secondary to unsteadiness and LOB. Pt would be unsafe to return to her prior living arrangements at this time. Pt will benefit from high intensity rehab with a focus on balance, gait, transfer and safety training to improve her overall safety and independence with functional mobility to allow for a return to her high PLOF. Prognosis: Good  PT Education: Transfer Training;Functional Mobility Training;Gait Training  REQUIRES PT FOLLOW UP: Yes  Activity Tolerance  Activity Tolerance: Patient Tolerated treatment well  Activity Tolerance: Improving tolerance overall to therapy. Patient Diagnosis(es): The primary encounter diagnosis was Non-intractable vomiting with nausea, unspecified vomiting type. A diagnosis of Chronic maxillary sinusitis was also pertinent to this visit. has a past medical history of Hypertension, Nephropathy, and ANTIONETTE on CPAP. has a past surgical history that includes  section. Restrictions  Restrictions/Precautions  Restrictions/Precautions: Fall Risk  Required Braces or Orthoses?: No  Position Activity Restriction  Other position/activity restrictions: up with assistance  Subjective   General  Response To Previous Treatment: Patient with no complaints from previous session.   Family / Caregiver Present: No  Subjective  Subjective: Pt supine in bed and agreeable to therapy this afternoon. RN agreeable to therapy. Pt denies any pain at this time. Pain Screening  Patient Currently in Pain: Denies  Vital Signs  Patient Currently in Pain: Denies       Cognition   Cognition  Overall Cognitive Status: Exceptions  Following Commands: Follows multistep commands with increased time; Follows multistep commands with repitition  Safety Judgement: Decreased awareness of need for assistance  Insights: Decreased awareness of deficits  Initiation: Does not require cues  Sequencing: Does not require cues  Objective   Bed mobility  Supine to Sit: Minimal assistance(without use of hand rail- min A for initial seated balance otherwise CGA)  Sit to Supine: (Pt retired to chair at end of session)  Scooting: Stand by assistance  Transfers  Sit to Stand: Contact guard assistance;Minimal Assistance(Min A for initial standing balance)  Stand to sit: Contact guard assistance  Ambulation  Ambulation?: Yes  More Ambulation?: Yes  Ambulation 1  Surface: level tile  Device: Rolling Walker  Assistance: Contact guard assistance;Minimal assistance  Quality of Gait: unsteady, decreased step length, decreased gait speed, narrow EMMA, requires assist to negotiate walker  Gait Deviations: Slow Rochelle;Decreased step length  Distance: 50ft  Ambulation 2  Surface - 2: level tile  Device 2: No device(HHA)  Assistance 2: Moderate assistance;Maximum assistance  Quality of Gait 2: significant unsteady, decreased step length, decreased gait speed, wandering gait, narrow EMMA  Gait Deviations: Slow Rochelle;Decreased step length  Distance: 30ft  Stairs/Curb  Stairs?: Yes  Stairs  # Steps : 2  Stairs Height: 6\"  Rails: Bilateral  Device: No Device  Assistance: Minimal assistance;2 Person assistance  Stairs  # Steps : 2  Stairs Height: 6\"  Rails: Right  Device: No Device  Assistance:  Moderate assistance;2 Person assistance     Balance  Posture: Fair  Sitting - Static: Good;-  Sitting - Dynamic: Fair;+  Standing - Static: Fair Standing - Dynamic: Fair;-  Comments: standing balance assessed with RW, poor with no device  Foam Surface  Eyes Open: 15 seconds  Sway: Moderate  Strategy: Ankle  Romberg/Narrow Base of Support  Eyes Open: 30 seconds  Sway: Minimal  Strategy: Ankle  Eyes Closed: 10 seconds  Sway: Moderate  Strategy: Step  Sharpened Romberg/Tandem  Eyes Open: 15 seconds(right and left first)  Sway: Moderate  Strategy: Ankle           Standing exercise program: Heel/toe raises, hip flexion, hip abduction, mini squats, hip extension.  Reps: 10 at [address] [place] [address] term goals  Time Frame for Short term goals: 14 visits  Short term goal 1: Pt to ambulate 300ft independently without device to allow for return to prior functional level  Short term goal 2: Pt to sit <> stand transfer independently to allow for return to prior functional level  Short term goal 3: Pt to tolerate 45 minutes worth of therapy for endurance  Short term goal 4: Pt to demonstrate good to good - standing balance to decrease risk of falls  Short term goal 5: Pt to ascend/descend flight of stairs SBA to allow for access to typical bedroom level of home    Plan    Plan  Times per week: 5-6x  Times per day: (1-2x per day)  Current Treatment Recommendations: Strengthening, Transfer Training, Balance Training, Functional Mobility Training, Endurance Training, Gait Training, Stair training, Home Exercise Program, Safety Education & Training, Patient/Caregiver Education & Training, Neuromuscular Re-education  Safety Devices  Type of devices: Call light within reach, Gait belt, Nurse notified, Left in chair  Restraints  Initially in place: No     Therapy Time   Individual Concurrent Group Co-treatment   Time In 2977         Time Out 1555         Minutes 39         Timed Code Treatment Minutes: Joaquin Jimenez 90, PT DISPLAY PLAN FREE TEXT

## 2021-04-28 NOTE — PLAN OF CARE
Problem: Infection:  Goal: Will remain free from infection  Description: Will remain free from infection  4/28/2021 1447 by Arron Cortez RN  Outcome: Ongoing     Problem: Safety:  Goal: Free from accidental physical injury  Description: Free from accidental physical injury  4/28/2021 1447 by Arron Cortez RN  Outcome: Ongoing  Note: Falling star program in place. Side rails up x2. Call light and personal belongings within reach. Continuing to maintain safe environment. Bed in lowest position and locked. Appropriate Identification armbands in place. Non-skid foot wear in place.

## 2021-04-29 VITALS
HEART RATE: 66 BPM | HEIGHT: 62 IN | RESPIRATION RATE: 16 BRPM | DIASTOLIC BLOOD PRESSURE: 72 MMHG | SYSTOLIC BLOOD PRESSURE: 149 MMHG | TEMPERATURE: 98.2 F | WEIGHT: 186.95 LBS | BODY MASS INDEX: 34.4 KG/M2 | OXYGEN SATURATION: 96 %

## 2021-04-29 LAB
ANION GAP SERPL CALCULATED.3IONS-SCNC: 6 MMOL/L (ref 9–17)
BUN BLDV-MCNC: 15 MG/DL (ref 8–23)
BUN/CREAT BLD: ABNORMAL (ref 9–20)
CALCIUM SERPL-MCNC: 8.1 MG/DL (ref 8.6–10.4)
CHLORIDE BLD-SCNC: 107 MMOL/L (ref 98–107)
CO2: 28 MMOL/L (ref 20–31)
CREAT SERPL-MCNC: 0.61 MG/DL (ref 0.5–0.9)
GFR AFRICAN AMERICAN: >60 ML/MIN
GFR NON-AFRICAN AMERICAN: >60 ML/MIN
GFR SERPL CREATININE-BSD FRML MDRD: ABNORMAL ML/MIN/{1.73_M2}
GFR SERPL CREATININE-BSD FRML MDRD: ABNORMAL ML/MIN/{1.73_M2}
GLUCOSE BLD-MCNC: 115 MG/DL (ref 70–99)
POTASSIUM SERPL-SCNC: 3.7 MMOL/L (ref 3.7–5.3)
SODIUM BLD-SCNC: 141 MMOL/L (ref 135–144)

## 2021-04-29 PROCEDURE — 2580000003 HC RX 258: Performed by: NURSE PRACTITIONER

## 2021-04-29 PROCEDURE — 6360000002 HC RX W HCPCS: Performed by: NURSE PRACTITIONER

## 2021-04-29 PROCEDURE — 97535 SELF CARE MNGMENT TRAINING: CPT

## 2021-04-29 PROCEDURE — 6370000000 HC RX 637 (ALT 250 FOR IP): Performed by: NURSE PRACTITIONER

## 2021-04-29 PROCEDURE — 94761 N-INVAS EAR/PLS OXIMETRY MLT: CPT

## 2021-04-29 PROCEDURE — 94640 AIRWAY INHALATION TREATMENT: CPT

## 2021-04-29 PROCEDURE — 99239 HOSP IP/OBS DSCHRG MGMT >30: CPT | Performed by: NURSE PRACTITIONER

## 2021-04-29 PROCEDURE — C9113 INJ PANTOPRAZOLE SODIUM, VIA: HCPCS | Performed by: NURSE PRACTITIONER

## 2021-04-29 PROCEDURE — 36415 COLL VENOUS BLD VENIPUNCTURE: CPT

## 2021-04-29 PROCEDURE — 6370000000 HC RX 637 (ALT 250 FOR IP): Performed by: INTERNAL MEDICINE

## 2021-04-29 PROCEDURE — 80048 BASIC METABOLIC PNL TOTAL CA: CPT

## 2021-04-29 PROCEDURE — 99232 SBSQ HOSP IP/OBS MODERATE 35: CPT | Performed by: INTERNAL MEDICINE

## 2021-04-29 RX ORDER — MECLIZINE HYDROCHLORIDE 25 MG/1
25 TABLET ORAL 3 TIMES DAILY PRN
DISCHARGE
Start: 2021-04-29 | End: 2021-05-09

## 2021-04-29 RX ORDER — ATORVASTATIN CALCIUM 20 MG/1
20 TABLET, FILM COATED ORAL NIGHTLY
Qty: 30 TABLET | Refills: 3 | DISCHARGE
Start: 2021-04-29

## 2021-04-29 RX ORDER — LOSARTAN POTASSIUM 25 MG/1
25 TABLET ORAL 2 TIMES DAILY
Qty: 30 TABLET | Refills: 1 | DISCHARGE
Start: 2021-04-29 | End: 2021-11-27 | Stop reason: HOSPADM

## 2021-04-29 RX ORDER — ERGOCALCIFEROL 1.25 MG/1
50000 CAPSULE ORAL WEEKLY
Qty: 9 CAPSULE | Refills: 0 | DISCHARGE
Start: 2021-05-04 | End: 2021-07-21

## 2021-04-29 RX ORDER — SCOLOPAMINE TRANSDERMAL SYSTEM 1 MG/1
1 PATCH, EXTENDED RELEASE TRANSDERMAL
Qty: 10 PATCH | Refills: 0 | DISCHARGE
Start: 2021-04-30 | End: 2021-05-30

## 2021-04-29 RX ORDER — LOSARTAN POTASSIUM 25 MG/1
25 TABLET ORAL 2 TIMES DAILY
Status: DISCONTINUED | OUTPATIENT
Start: 2021-04-29 | End: 2021-04-29 | Stop reason: HOSPADM

## 2021-04-29 RX ORDER — FLUTICASONE PROPIONATE 110 UG/1
2 AEROSOL, METERED RESPIRATORY (INHALATION) 2 TIMES DAILY
Qty: 1 INHALER | Refills: 3 | Status: SHIPPED | OUTPATIENT
Start: 2021-04-29 | End: 2022-04-29

## 2021-04-29 RX ORDER — ASPIRIN 81 MG/1
81 TABLET, CHEWABLE ORAL DAILY
Qty: 30 TABLET | Refills: 1 | DISCHARGE
Start: 2021-04-30

## 2021-04-29 RX ORDER — ALBUTEROL SULFATE 90 UG/1
2 POWDER, METERED RESPIRATORY (INHALATION) EVERY 4 HOURS PRN
Qty: 1 INHALER | Refills: 1 | DISCHARGE
Start: 2021-04-29

## 2021-04-29 RX ORDER — CLOPIDOGREL BISULFATE 75 MG/1
75 TABLET ORAL DAILY
Qty: 21 TABLET | Refills: 0 | DISCHARGE
Start: 2021-04-30 | End: 2021-07-21

## 2021-04-29 RX ORDER — CLOPIDOGREL BISULFATE 75 MG/1
75 TABLET ORAL DAILY
Qty: 30 TABLET | Refills: 3 | DISCHARGE
Start: 2021-04-30 | End: 2021-04-29

## 2021-04-29 RX ADMIN — ENOXAPARIN SODIUM 40 MG: 40 INJECTION SUBCUTANEOUS at 08:29

## 2021-04-29 RX ADMIN — PANTOPRAZOLE SODIUM 40 MG: 40 INJECTION, POWDER, FOR SOLUTION INTRAVENOUS at 08:29

## 2021-04-29 RX ADMIN — CLOPIDOGREL BISULFATE 75 MG: 75 TABLET ORAL at 08:29

## 2021-04-29 RX ADMIN — ASPIRIN 81 MG: 81 TABLET, CHEWABLE ORAL at 08:29

## 2021-04-29 RX ADMIN — BUDESONIDE AND FORMOTEROL FUMARATE DIHYDRATE 2 PUFF: 80; 4.5 AEROSOL RESPIRATORY (INHALATION) at 08:38

## 2021-04-29 RX ADMIN — LOSARTAN POTASSIUM 25 MG: 25 TABLET, FILM COATED ORAL at 08:29

## 2021-04-29 RX ADMIN — SODIUM CHLORIDE, PRESERVATIVE FREE 10 ML: 5 INJECTION INTRAVENOUS at 08:29

## 2021-04-29 ASSESSMENT — PAIN SCALES - GENERAL
PAINLEVEL_OUTOF10: 0
PAINLEVEL_OUTOF10: 0

## 2021-04-29 NOTE — PLAN OF CARE
RT in to see patient for Inhaler Therapy , Pt tolerated well , non productive noted . 02 sat revealed 96% on room air . Pt appears alert , awake ready to be discharged . Water provided to rinse mouth following tx.

## 2021-04-29 NOTE — PROGRESS NOTES
Nephrology Progress Note      SUBJECTIVE       Pt was seen and examined. Previous entries noted. 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 BERKLEY 2R negative. Her serum creatinine is stable at 0.6-0.7 range. Losartan was recently added yesterday for hypertension as well as proteinuria. Pressures this morning is still in the 193 systolic range. Plans for possible transfer to rehab also to home noted. She will continue to follow with nephrology team out at [facility]. OBJECTIVE      CURRENT TEMPERATURE:  Temp: 98.2 °F (36.8 °C)  MAXIMUM TEMPERATURE OVER 24HRS:  Temp (24hrs), Av.1 °F (36.7 °C), Min:97.6 °F (36.4 °C), Max:98.5 °F (36.9 °C)    CURRENT RESPIRATORY RATE:  Resp: 16  CURRENT PULSE:  Pulse: 66  CURRENT BLOOD PRESSURE:  BP: (!) 149/72  24HR BLOOD PRESSURE RANGE:  Systolic (32WZY), SPA:392 , Min:138 , EOY:258   ; Diastolic (27FCW), OHK:42, Min:67, Max:88    24HR INTAKE/OUTPUT:      Intake/Output Summary (Last 24 hours) at 2021 0843  Last data filed at 2021 1315  Gross per 24 hour   Intake 1109.34 ml   Output    Net 1109.34 ml     WEIGHT :  Patient Vitals for the past 96 hrs (Last 3 readings):   Weight   21 0658 186 lb 15.2 oz (84.8 kg)   21 0427 192 lb 14.4 oz (87.5 kg)   21 0337 193 lb 9 oz (87.8 kg)     PHYSICAL EXAM      GENERAL APPEARANCE:Awake, alert, in no acute distress  SKIN: warm and dry, no rash or erythema  EYES: conjunctivae normal and sclera anicteric  ENT: no thrush no pharyngeal congestion   NECK:   No JVD. No carotid bruits and no carotid lymphadenopathy . PULMONARY: lungs are clear to auscultation. No Wheezing, no ronchi . CADRDIOVASCULAR: S1 and S2 normal NO S3 and NO S4 . No rubs , no murmur. ABDOMEN: soft nontender, bowel sounds present, no organomegaly, no ascites.      EXTREMITIES: + Slight edema     CURRENT MEDICATIONS      budesonide-formoterol (SYMBICORT) 80-4.5 MCG/ACT inhaler 2 puff, BID  albuterol sulfate  (90 BACTERIA None 04/25/2021    SPECGRAV 1.025 04/25/2021    LEUKOCYTESUR NEGATIVE 04/25/2021    UROBILINOGEN Normal 04/25/2021    BILIRUBINUR NEGATIVE 04/25/2021    GLUCOSEU NEGATIVE 04/25/2021    KETUA NEGATIVE 04/25/2021    AMORPHOUS NOT REPORTED 04/25/2021         ASSESSMENT      1. Biopsy-proven membranous nephropathy, BERKLEY 2 are negative. P proteinuria of around 6 g, most recent proteinuria is somewhere between 0.8 to 1 g.  2. HTN: Will adjust losartan  3. Acute ischemic cerebellar stroke which is left her with some dizziness. Patient to go to rehab for above  4. Dyslipidemia    PLAN      1. Change losartan to 25 mg p.o. twice a day. 2.  From renal standpoint she is okay for discharge to rehab.  3.  She should ideally have her labs checked this coming Monday or so. 4.  She will continue to follow with nephrology team out at [facility]. Please do not hesitate to call with questions.     This note is created with the assistance of a speech-recognition program. While intending to generate a document that actually reflects the content of the visit, no guarantees can be provided that every mistake has been identified and corrected by editing    Electronically signed by Matthew Marley MD on 4/29/2021 at 8:43 AM

## 2021-04-29 NOTE — PLAN OF CARE
Problem: Infection:  Goal: Will remain free from infection  Description: Will remain free from infection  4/29/2021 0108 by Andre Oliver RN  Outcome: Ongoing     Problem: Safety:  Goal: Free from accidental physical injury  Description: Free from accidental physical injury  4/29/2021 0108 by Andre Oliver, RN  Outcome: Ongoing  Goal: Free from intentional harm  Description: Free from intentional harm  4/29/2021 0108 by Andre Oliver, RN  Outcome: Ongoing     Problem: Daily Care:  Goal: Daily care needs are met  Description: Daily care needs are met  4/29/2021 0108 by Andre Oliver RN  Outcome: Ongoing     Problem: Pain:  Goal: Patient's pain/discomfort is manageable  Description: Patient's pain/discomfort is manageable  4/29/2021 0108 by Andre Oliver RN  Outcome: Ongoing     Problem: Skin Integrity:  Goal: Skin integrity will stabilize  Description: Skin integrity will stabilize  4/29/2021 0108 by Andre Oliver RN  Outcome: Ongoing     Problem: Discharge Planning:  Goal: Patients continuum of care needs are met  Description: Patients continuum of care needs are met  4/29/2021 0108 by Andre Oliver RN  Outcome: Ongoing     Problem: Cardiac:  Goal: Ability to maintain vital signs within normal range will improve  Description: Ability to maintain vital signs within normal range will improve  4/29/2021 0108 by Andre Oliver RN  Outcome: Ongoing  Goal: Cardiovascular alteration will improve  Description: Cardiovascular alteration will improve  4/29/2021 0108 by Andre Oliver RN  Outcome: Ongoing     Problem: Health Behavior:  Goal: Will modify at least one risk factor affecting health status  Description: Will modify at least one risk factor affecting health status  4/29/2021 0108 by Andre Oliver RN  Outcome: Ongoing  Goal: Identification of resources available to assist in meeting health care needs will improve  Description: Identification of resources available to assist in meeting health care needs will improve  4/29/2021 0108 by Anupam De La Cruz RN  Outcome: Ongoing     Problem: Physical Regulation:  Goal: Complications related to the disease process, condition or treatment will be avoided or minimized  Description: Complications related to the disease process, condition or treatment will be avoided or minimized  4/29/2021 0108 by Anupam De La Cruz RN  Outcome: Ongoing     Problem: Falls - Risk of:  Goal: Will remain free from falls  Description: Will remain free from falls  4/29/2021 0108 by Anupam De La Cruz RN  Outcome: Ongoing  Goal: Absence of physical injury  Description: Absence of physical injury  4/29/2021 0108 by Anupam De La Cruz RN  Outcome: Ongoing

## 2021-04-29 NOTE — PROGRESS NOTES
Patient discharged to [facility] rehab in the [place] area with . She is stable upon discharge. IV removed intact and without difficulty. All belongings and paperwork sent with them for rehab. Patient wheeled out to vehicle. [facility] called back for report on patient.

## 2021-04-29 NOTE — DISCHARGE INSTR - COC
Vital Signs: BP (!) 141/71   Pulse 64   Temp 97.8 °F (36.6 °C) (Oral)   Resp 18   Ht 5' 2\" (1.575 m)   Wt 186 lb 15.2 oz (84.8 kg)   SpO2 94%   BMI 34.19 kg/m²     Last documented pain score (0-10 scale): Pain Level: 0  Last Weight:   Wt Readings from Last 1 Encounters:   04/29/21 186 lb 15.2 oz (84.8 kg)     Mental Status:  oriented    IV Access:  - None    Nursing Mobility/ADLs:  Walking   Assisted  Transfer  Assisted  Bathing  Assisted  Dressing  Assisted  Toileting  Assisted  Feeding  Independent  Med Admin  Independent  Med Delivery   whole    Wound Care Documentation and Therapy:        Elimination:  Continence:   · Bowel: Yes  · Bladder: Yes  Urinary Catheter: None   Colostomy/Ileostomy/Ileal Conduit: No       Date of Last BM: 04/28/2021    Intake/Output Summary (Last 24 hours) at 4/29/2021 0801  Last data filed at 4/28/2021 1315  Gross per 24 hour   Intake 1109.34 ml   Output    Net 1109.34 ml     I/O last 3 completed shifts: In: 1109.3 [I.V.:1109.3]  Out: -     Safety Concerns: At Risk for Falls    Impairments/Disabilities:      DIZZINESS    Nutrition Therapy:  Current Nutrition Therapy:   - Oral Diet:  General    Routes of Feeding: Oral  Liquids: No Restrictions  Daily Fluid Restriction: no  Last Modified Barium Swallow with Video (Video Swallowing Test): not done    Treatments at the Time of Hospital Discharge:   Respiratory Treatments: NONE  Oxygen Therapy:  is not on home oxygen therapy.   Ventilator:    - No ventilator support    Rehab Therapies: Physical Therapy and Occupational Therapy  Weight Bearing Status/Restrictions: No weight bearing restirctions  Other Medical Equipment (for information only, NOT a DME order): none  Other Treatments: none    Patient's personal belongings (please select all that are sent with patient):  all belongings sent with patient    RN SIGNATURE:  Electronically signed by Aisstaou Estevez RN on 4/29/21 at 9:33 AM EDT    CASE MANAGEMENT/SOCIAL WORK SECTION Inpatient Status Date: 04/26/2021    Readmission Risk Assessment Score:  Readmission Risk              Risk of Unplanned Readmission:        14           Discharging to Facility/ Agency   · Name: [facility]   · Address: [address] [place] [address] [address] [place] [address], [address]  · Phone: [phone]  · Fax:    Dialysis Facility (if applicable)   · Name:  · Address:  · Dialysis Schedule:  · Phone:  · Fax:    / signature: Electronically signed by Elyssa Maxwell RN on 4/29/21 at 8:02 AM EDT    PHYSICIAN SECTION    Prognosis: Fair    Condition at Discharge: Stable    Rehab Potential (if transferring to Rehab): Fair    Recommended Labs or Other Treatments After Discharge: PT/OT  F/U with neurology in 4-6 weeks, nephrology as scheduled  Holter monitoring  BMP on Monday May 3, 8421    Physician Certification: I certify the above information and transfer of Baron Singer  is necessary for the continuing treatment of the diagnosis listed and that she requires Acute Rehab for less 30 days.      Update Admission H&P: Changes in H&P as follows - as in DC summary    PHYSICIAN SIGNATURE:  Electronically signed by Oscar Titus MD on 4/29/21 at 8:49 AM EDT

## 2021-04-29 NOTE — PROGRESS NOTES
Occupational Therapy  Facility/Department: [facility] SURG ICU  Daily Treatment Note  NAME: Megan Asif  : 1950  MRN: [id number]    Date of Service: 2021      Discharge Recommendations:  Patient would benefit from continued therapy after discharge ; Further therapy recommended at discharge. The patient should be able to tolerate at least three hours of therapy per day over 5 days or 15 hours over 7 days. OT Equipment Recommendations  Equipment Needed: Yes  Mobility Devices: ADL Assistive Devices  Walker: Rolling  ADL Assistive Devices: Shower Chair with back    Assessment   Performance deficits / Impairments: Decreased functional mobility ; Decreased ADL status; Decreased strength;Decreased safe awareness;Decreased endurance;Decreased balance;Decreased high-level IADLs;Decreased coordination  Assessment: Pt pleasant and motiviated for tx this date; encouragement provided to pt for continual activity participation to maximize pt's independence to prior level of functioning - pt verbalized understanding. Pt would continue to benefit from skilled OT service during stay and upon discharge to promote activity tolerance, balance, and independence to perform all functional activites. Pt would further benefit from high-intensity therapy to maximize pt's reduced balance, safety awareness, activity tolerance, and independence to perform functional mobility/transfers and ADLs secondary to acute/subacute ischemia of the left cerebellar hemisphere.   Prognosis: Good  OT Education: Transfer Training;Energy Conservation;Plan of Care  Patient Education: educated on maintain gaze forward to aid with dizziness management - educated on gaining stability/balance prior to further participation in functional mobility/transfers  REQUIRES OT FOLLOW UP: Yes  Activity Tolerance  Activity Tolerance: Patient Tolerated treatment well  Activity Tolerance: pt reports overall reduced activity tolerance for ADL participation, however noted slight improvement  Safety Devices  Safety Devices in place: Yes  Type of devices: Call light within reach; Left in chair;Nurse notified  Restraints  Initially in place: No         Patient Diagnosis(es): The primary encounter diagnosis was Non-intractable vomiting with nausea, unspecified vomiting type. Diagnoses of Chronic maxillary sinusitis and Acute CVA (cerebrovascular accident) [facility]) were also pertinent to this visit. has a past medical history of Hypertension, Nephropathy, and ANTIONETTE on CPAP. has a past surgical history that includes  section. Restrictions  Restrictions/Precautions  Restrictions/Precautions: Fall Risk  Required Braces or Orthoses?: No  Position Activity Restriction  Other position/activity restrictions: up with assistance     Subjective   General  Patient assessed for rehabilitation services?: Yes  Response to previous treatment: Patient with no complaints from previous session  Family / Caregiver Present: No  Subjective  Subjective: Pt motivated and engaged throughout session. General Comment  Comments: RN ok'd for tx today. Vital Signs  Patient Currently in Pain: Denies     Orientation  Orientation  Overall Orientation Status: Within Functional Limits     Objective    ADL  Grooming: Contact guard assistance(close SBA-CGA standing at sink with RW to brush teeth and wash face)  UE Bathing: Minimal assistance; Increased time to complete(unsupported seated in recliner to wash/dry UEs and chest - min A to wash/dry back)  UE Dressing: Minimal assistance; Increased time to complete(seated in recliner pt don shirt - min A to don bra)  LE Dressing: Verbal cueing;Contact guard assistance(to pull undergarment and pants up and around waist - VCs to don prior to further functional mobility)  Toileting: Contact guard assistance(for balance/stability during perineal/bottom hygiene)  Additional Comments: CGA-min A to perform unsupported, UE sponge bath and dressing tasks, seated in recliner - increased time to perform. Pt reports increased time and assistance required to perform ADLs due to recent medical changes, encouragement provided for continual activity participation to maximize pt's independence to prior functioning. Balance  Sitting Balance: Stand by assistance(dynamic, unsupported sitting in recliner to reach for soap/wash cloths/clothes during ADLs. )  Standing Balance: Contact guard assistance(close SBA-CGA standing at sink for grooming tasks)  Standing Balance  Time: ~6 minutes  Activity: standing at sink with RW, close SBA-CGA, to brush teeth and wash face  Comment: No true LOB observed througout session - continual education provided to pt to gain balance/stability prior to further participation functional mobility and maintain gaze forward to aid with dizziness secondary to acute/subacute ischemia to left cerebellar hemisphere. Functional Mobility  Functional - Mobility Device: Rolling Walker  Activity: To/from bathroom  Assist Level: Contact guard assistance  Functional Mobility Comments: pt reports dizziness onset when directing gaze downward - encouraged pt to remain focus/attention forward during functional mobility for management of dizziness. No true LOB observed - 1x standing rest break prior to turning for stand>sit transfer. Toilet Transfers  Toilet - Technique: Ambulating(w/RW)  Equipment Used: Standard toilet(use of R grab bar)  Toilet Transfer: Contact guard assistance    Bed mobility  Comment: Pt seated at EOB upon arrival following eating breakfast - pt up recliner following end of session. Transfers  Sit to stand: Contact guard assistance  Stand to sit: Contact guard assistance  Transfer Comments: min VCs for body positioning/directioning            Cognition  Overall Cognitive Status: Exceptions  Following Commands: Follows multistep commands with increased time; Follows multistep commands with repitition  Safety Judgement: Decreased awareness of need for assistance  Problem Solving: Assistance required to identify errors made;Assistance required to implement solutions  Cognition Comment: 1-2 VCs for safety awareness/positioning throughout tx. Plan   Plan  Times per week: 5-6x/week  Times per day: Daily  Current Treatment Recommendations: Balance Training, Functional Mobility Training, Safety Education & Training, Equipment Evaluation, Education, & procurement, Self-Care / ADL, Neuromuscular Re-education, Home Management Training, Patient/Caregiver Education & Training, Endurance Training  Plan Comment: Continue with above noted treatment recommendations. Goals  Short term goals  Time Frame for Short term goals: 10 visits  Short term goal 1: Pt will IND demo good safety awareness for increased participation to perform functional mobility/transfers and ADLs. Short term goal 2: Pt will demo Mod IND, with least restrictive AD, to perform functional mobility/transfers during ADLs. Short term goal 3: Pt will demo Mod IND, with DME/AE as needed, to perform ADLs. Short term goal 4: Pt will tolerate 10+ minutes of dynamic standing during functional tasks for increased participation during ADLs. Short term goal 5: Pt will IND incorporate energy conservation technqiues for increased participation throughout all functional activities.        Therapy Time   Individual Concurrent Group Co-treatment   Time In 0800         Time Out 0831         Minutes 31         Timed Code Treatment Minutes: [address],David 100, OTR/L

## 2021-04-29 NOTE — DISCHARGE SUMMARY
[facility]  Office: [address], DO, Eamon Brown, DO, Brianna Rojas, DO, Dirk Resendiz, DO, Lindsey Paredes MD, Janusz Putnam MD, Juanita Leon MD, Katherine Huynh MD, Floresita Corcoran MD, Kolby Pérez MD, Nicolas Funes MD, Estella Lord MD, Kerline Aguirre, DO, Court Quintero MD, Mumtaz Bañuelos DO, Rocky Cuellar MD,  Rina Banks, DO, Abi Maradiaga MD, Brad Joy MD, Arlette Ureña MD, Katy Milton MD, Araceli Méndez, [facility], [facility], CNP, Juan Manuel Caputo, CNP, Jose Angel Campuzano, CNS, Alvarez James, CNP, Thompson Lombard, CNP, Alvaro Laboy, CNP, Dave Allen, CNP, Jeanine Carter, CNP, Janak Bone PA-C, Jo Ann Melo, [facility], Nick Rivera, CNP, River Morris, CNP, Franc Mejia, CNP, Caitie Wallace, CNP, Harvey Carvalho, CNP, Melina Vitale, Adele [address]    Discharge Summary     Patient ID: Noa Stevens  :  1950   MRN: [id number]     ACCOUNT:  [de-identified]   Patient's PCP: Dannie Allen DO  Admit Date: 2021   Discharge Date: 2021   Length of Stay: 3  Code Status:  Full Code  Admitting Physician: Court Quintero MD  Discharge Physician: FRANKY Orosco NP     Active Discharge Diagnoses:     Hospital Problem Lists:  Principal Problem:    Dizzy  Active Problems:    Hypertension    Hypercholesterolemia    Osteoarthritis    Migraine without aura and without status migrainosus, not intractable    Sleep apnea    [facility]    Chronic maxillary sinusitis    Vitamin D deficiency    Membranous glomerulonephritis    Persistent proteinuria  Resolved Problems:    Non-intractable vomiting      Admission Condition:  fair     Discharged Condition: good    Hospital Stay:     Hospital Course:  Noa Stevens is a 78 y.o. female who was admitted for the management of  Dizzy , presented to ER with Emesis (onset at 1700) and Dizziness    He also had some complaints of feeling somewhat confused. A CT of the head and CTA of the head neck were negative for any acute findings though there was mention of chronic bilateral maxillary and ethmoid sinus disease. Troponins were unremarkable. She received IV hydration, Zofran and IV Rocephin. 2D echo was completed and revealed an EF of approximately 61% without any significant valvular or pericardial disease seen. MRI of the brain revealed an acute left cerebellar ischemic stroke and evidence of an old right cerebellar stroke. Neuro was consulted and started patient on Plavix, low-dose aspirin, and statin. Patient is to be on dual antiplatelet therapy with Plavix and low-dose aspirin for a total of 3 weeks,  and then be on low-dose aspirin long-term. Her dizziness and nausea improved throughout her stay. Neurology also recommended Holter monitor upon discharge. She was given scopolamine patch and this appeared to help her nausea and not worsen her dizziness. She was able to work with PT and OT. She was evaluated by nephrology for biopsy-proven membranous nephropathy/proteinuria. She was started on losartan this hospital stay. Patient was ultimately discharged to acute rehab in [facility] for further monitoring and therapies.       Significant therapeutic interventions:     Statin    Dual antiplatelet therapy    Nephrology consultation and evaluation    Neurology consultation and evaluation    PT/OT    Significant Diagnostic Studies: As above  Labs / Micro:  CBC:   Lab Results   Component Value Date    WBC 8.8 04/26/2021    RBC 4.60 04/26/2021    HGB 13.1 04/26/2021    HCT 40.9 04/26/2021    MCV 88.9 04/26/2021    MCH 28.5 04/26/2021    MCHC 32.0 04/26/2021    RDW 13.5 04/26/2021     04/26/2021     BMP:    Lab Results   Component Value Date    GLUCOSE 115 04/29/2021     04/29/2021    K 3.7 04/29/2021     04/29/2021    CO2 28 04/29/2021    ANIONGAP 6 04/29/2021    BUN 15 04/29/2021    CREATININE 0.61 04/29/2021 BUNCRER NOT REPORTED 04/29/2021    CALCIUM 8.1 04/29/2021    LABGLOM >60 04/29/2021    GFRAA >60 04/29/2021    GFR      04/29/2021    GFR NOT REPORTED 04/29/2021     U/A:    Lab Results   Component Value Date    COLORU YELLOW 04/25/2021    TURBIDITY CLEAR 04/25/2021    SPECGRAV 1.025 04/25/2021    HGBUR SMALL 04/25/2021    PHUR 7.0 04/25/2021    PROTEINU 3+ 04/25/2021    GLUCOSEU NEGATIVE 04/25/2021    KETUA NEGATIVE 04/25/2021    BILIRUBINUR NEGATIVE 04/25/2021    UROBILINOGEN Normal 04/25/2021    NITRU NEGATIVE 04/25/2021    LEUKOCYTESUR NEGATIVE 04/25/2021     TSH:    Lab Results   Component Value Date    TSH 1.45 04/26/2021        Radiology:  Xr Abdomen (kub) (single Ap View)    Result Date: 4/26/2021  Unremarkable bowel gas pattern. No evidence for obstruction. Calcific densities noted in the left side of pelvis may represent retained contrast versus calcifications in the noted raising it and leiomyomas of the etiologies not excluded. Ct Head Wo Contrast    Result Date: 4/25/2021  No acute intracranial abnormality. Bilateral maxillary and ethmoid sinus disease. Xr Chest Portable    Result Date: 4/25/2021  No acute process. Cta Head Neck W Contrast    Result Date: 4/25/2021  Mild atherosclerotic disease. No large vessel occlusion or hemodynamic stenosis identified     Mri Brain Wo Contrast    Result Date: 4/26/2021  Small focus of acute/subacute ischemia in the left cerebellar hemisphere. Mild chronic sinusitis in the paranasal sinuses. Consultations:    Consults:     Final Specialist Recommendations/Findings:   IP CONSULT TO NEUROLOGY  IP CONSULT TO NEPHROLOGY  follow-up with neurology in 4 to 6 weeks. Continue to follow with nephrology team at [address] [place] [address]. The patient was seen and examined on day of discharge.     Review of Systems:      Constitutional:  negative for chills, fevers, sweats  Respiratory:  negative for cough, dyspnea on exertion, shortness of breath, wheezing  Cardiovascular: negative for chest pain, chest pressure/discomfort, lower extremity edema, palpitations  Gastrointestinal:  negative for abdominal pain, constipation, diarrhea, nausea, vomiting  Neurological:  negative for  Headache, + dizziness    Physical Examination:         General appearance:  alert, cooperative and no distress  Mental Status:  oriented to person, place and time and normal affect  Lungs:  clear to auscultation bilaterally, normal effort  Heart:  regular rate and rhythm, no murmur  Abdomen:  soft, nontender, nondistended, normal bowel sounds, no masses, hepatomegaly, splenomegaly  Extremities:  no edema, redness, tenderness in the calves  Skin:  no gross lesions, rashes, induration    Discharge plan:     Disposition: To a non-[facility] facility    Physician Follow Up:     Marco Willis DO  [address] [facility] [address] [place] [address]  [phone]    Schedule an appointment as soon as possible for a visit  Follow-up as soon as possible after discharge from acute rehab. Follow-up with neurology in 4 to 6 weeks    Requiring Further Evaluation/Follow Up POST HOSPITALIZATION/Incidental Findings: Follow up with Holter monitor results    Follow up with nephrology recommendations    Follow up on neurology recommendations    Trend renal function. Diet: cardiac diet    Activity: As tolerated    Instructions to Patient:     Take medications as prescribed    Follow-up with neurology in 4 to 6 weeks. Follow[facility]up with nephrology at [facility].     Discharge Medications:      Medication List      START taking these medications    aspirin 81 MG chewable tablet  Take 1 tablet by mouth daily  Start taking on: April 30, 2021     atorvastatin 20 MG tablet  Commonly known as: LIPITOR  Take 1 tablet by mouth nightly     clopidogrel 75 MG tablet  Commonly known as: PLAVIX  Take 1 tablet by mouth daily for 21 days  Start taking on: April 30, 2021     fluticasone 110 MCG/ACT inhaler  Commonly known as: Flovent HFA  Inhale 2 puffs into the lungs 2 times daily     losartan 25 MG tablet  Commonly known as: COZAAR  Take 1 tablet by mouth 2 times daily     meclizine 25 MG tablet  Commonly known as: ANTIVERT  Take 1 tablet by mouth 3 times daily as needed for Dizziness     ProAir RespiClick 355 (90 Base) MCG/ACT aerosol powder inhalation  Generic drug: albuterol sulfate  Inhale 2 puffs into the lungs every 4 hours as needed for Wheezing or Shortness of Breath     scopolamine transdermal patch  Commonly known as: TRANSDERM-SCOP  Place 1 patch onto the skin every 72 hours  Start taking on: April 30, 2021     vitamin D 1.25 MG (82894 UT) Caps capsule  Commonly known as: ERGOCALCIFEROL  Take 1 capsule by mouth once a week for 9 doses  Start taking on: May 4, 2021        STOP taking these medications    LASIX PO     METOPROLOL TARTRATE PO     NORVASC PO           Where to Get Your Medications      These medications were sent to [address], [address] [address]    Phone: [phone]   · fluticasone 110 MCG/ACT inhaler     Information about where to get these medications is not yet available    Ask your nurse or doctor about these medications  · aspirin 81 MG chewable tablet  · atorvastatin 20 MG tablet  · clopidogrel 75 MG tablet  · losartan 25 MG tablet  · meclizine 25 MG tablet  · ProAir RespiClick 813 (90 Base) MCG/ACT aerosol powder inhalation  · scopolamine transdermal patch  · vitamin D 1.25 MG (87900 UT) Caps capsule         Discharge Procedure Orders   Cardiac event monitor   Standing Status: Future Standing Exp. Date: 06/28/21       Time Spent on discharge is  32 mins in patient examination, evaluation, counseling as well as medication reconciliation, prescriptions for required medications, discharge plan and follow up.     Electronically signed by   FRANKY Bradshaw NP  4/29/2021  9:49 AM      Thank you Dr. Kristina Morton DO for the opportunity to be involved in this patient's care.

## 2021-04-29 NOTE — PROGRESS NOTES
RN called THE [facility] to give report. They are unable to take report at this time. They will call back shortly.

## 2021-04-29 NOTE — PLAN OF CARE
Problem: Infection:  Goal: Will remain free from infection  Description: Will remain free from infection  4/29/2021 0108 by Carin Sigala RN  Outcome: Ongoing  4/28/2021 1447 by Kaiden Araujo RN  Outcome: Ongoing     Problem: Safety:  Goal: Free from accidental physical injury  Description: Free from accidental physical injury  4/29/2021 0108 by Carin Sigala RN  Outcome: Ongoing  4/28/2021 1447 by Kaiden Araujo RN  Outcome: Ongoing  Note: Falling star program in place. Side rails up x2. Call light and personal belongings within reach. Continuing to maintain safe environment. Bed in lowest position and locked. Appropriate Identification armbands in place. Non-skid foot wear in place.      Goal: Free from intentional harm  Description: Free from intentional harm  Outcome: Ongoing     Problem: Daily Care:  Goal: Daily care needs are met  Description: Daily care needs are met  Outcome: Ongoing     Problem: Pain:  Goal: Patient's pain/discomfort is manageable  Description: Patient's pain/discomfort is manageable  Outcome: Ongoing     Problem: Skin Integrity:  Goal: Skin integrity will stabilize  Description: Skin integrity will stabilize  Outcome: Ongoing     Problem: Cardiac:  Goal: Ability to maintain vital signs within normal range will improve  Description: Ability to maintain vital signs within normal range will improve  4/29/2021 0108 by Carin Sigala RN  Outcome: Ongoing  4/28/2021 1447 by Kaiden Araujo RN  Outcome: Ongoing  Goal: Cardiovascular alteration will improve  Description: Cardiovascular alteration will improve  Outcome: Ongoing     Problem: Health Behavior:  Goal: Will modify at least one risk factor affecting health status  Description: Will modify at least one risk factor affecting health status  Outcome: Ongoing  Goal: Identification of resources available to assist in meeting health care needs will improve  Description: Identification of resources available to assist in meeting health care needs will improve  Outcome: Ongoing     Problem: Physical Regulation:  Goal: Complications related to the disease process, condition or treatment will be avoided or minimized  Description: Complications related to the disease process, condition or treatment will be avoided or minimized  Outcome: Ongoing     Problem: Falls - Risk of:  Goal: Will remain free from falls  Description: Will remain free from falls  Outcome: Ongoing  Goal: Absence of physical injury  Description: Absence of physical injury  Outcome: Ongoing

## 2021-07-21 RX ORDER — FUROSEMIDE 40 MG/1
40 TABLET ORAL 2 TIMES DAILY
COMMUNITY
Start: 2021-05-06 | End: 2021-11-27 | Stop reason: HOSPADM

## 2021-07-22 ENCOUNTER — VIRTUAL VISIT (OUTPATIENT)
Dept: NEUROLOGY | Age: 71
End: 2021-07-22
Payer: MEDICARE

## 2021-07-22 ENCOUNTER — TELEPHONE (OUTPATIENT)
Dept: NEUROLOGY | Age: 71
End: 2021-07-22

## 2021-07-22 DIAGNOSIS — M54.16 LUMBAR RADICULOPATHY: Primary | ICD-10-CM

## 2021-07-22 DIAGNOSIS — I63.9 CEREBELLAR STROKE (HCC): ICD-10-CM

## 2021-07-22 PROCEDURE — G8417 CALC BMI ABV UP PARAM F/U: HCPCS | Performed by: PSYCHIATRY & NEUROLOGY

## 2021-07-22 PROCEDURE — 3017F COLORECTAL CA SCREEN DOC REV: CPT | Performed by: PSYCHIATRY & NEUROLOGY

## 2021-07-22 PROCEDURE — 4040F PNEUMOC VAC/ADMIN/RCVD: CPT | Performed by: PSYCHIATRY & NEUROLOGY

## 2021-07-22 PROCEDURE — G8400 PT W/DXA NO RESULTS DOC: HCPCS | Performed by: PSYCHIATRY & NEUROLOGY

## 2021-07-22 PROCEDURE — G8427 DOCREV CUR MEDS BY ELIG CLIN: HCPCS | Performed by: PSYCHIATRY & NEUROLOGY

## 2021-07-22 PROCEDURE — 1123F ACP DISCUSS/DSCN MKR DOCD: CPT | Performed by: PSYCHIATRY & NEUROLOGY

## 2021-07-22 PROCEDURE — 1090F PRES/ABSN URINE INCON ASSESS: CPT | Performed by: PSYCHIATRY & NEUROLOGY

## 2021-07-22 PROCEDURE — 1036F TOBACCO NON-USER: CPT | Performed by: PSYCHIATRY & NEUROLOGY

## 2021-07-22 PROCEDURE — 99214 OFFICE O/P EST MOD 30 MIN: CPT | Performed by: PSYCHIATRY & NEUROLOGY

## 2021-07-22 NOTE — PROGRESS NOTES
[facility], [address], [address] [place] [address]  Ph: [phone] or [phone]  FAX: [phone]    This is a telehealth visit    Chief Complaint: ischemic stroke     Dear Abdulaziz Collins, DO     I had the pleasure of seeing your patient today in neurology consultation for her symptoms. As you would recall Ida Ingram is a 78 y.o. female. Patient has previously experienced an ischemia stroke on the left cerebellar hemisphere. She reports nausea and dizziness. She is no longer taking Meclozine due to symptom relief. Patient admits to experiencing fatigue occasionally causing her to experience loss of balance. She has also experienced pain in her lower extremity that radiates to the feet. Patient has started physical therapy for back pain. Patient inquires about how to lower risk for another stroke. She reports occasional cognitive deficits and digestive complications. MRI Brain WO Contrast on 2021: Small focus of acute/subacute ischemia in the left cerebellar hemisphere. Mild chronic sinusitis in the paranasal sinuses. CTA Head Neck W Contrast on 2021: Mild atherosclerotic disease.  No large vessel occlusion or hemodynamic stenosis identified. Past Medical History:   Diagnosis Date    Hypertension     Nephropathy     ANTIONETTE on CPAP      Past Surgical History:   Procedure Laterality Date     SECTION       No Known Allergies  History reviewed. No pertinent family history.    Social History     Socioeconomic History    Marital status:      Spouse name: Not on file    Number of children: Not on file    Years of education: Not on file    Highest education level: Not on file   Occupational History    Not on file   Tobacco Use    Smoking status: Never Smoker    Smokeless tobacco: Never Used   Vaping Use    Vaping Use: Never used   Substance and Sexual Activity    Alcohol use: Never    Drug use: Never    Sexual activity: Not on file   Other Topics Concern    Not on file   Social History Narrative    Not on file     Social Determinants of Health     Financial Resource Strain:     Difficulty of Paying Living Expenses:    Food Insecurity:     Worried About Running Out of Food in the Last Year:     [address] in the Last Year:    Transportation Needs:     Lack of Transportation (Medical):  Lack of Transportation (Non-Medical):    Physical Activity:     Days of Exercise per Week:     Minutes of Exercise per Session:    Stress:     Feeling of Stress :    Social Connections:     Frequency of Communication with Friends and Family:     Frequency of Social Gatherings with Friends and Family:     Attends Gnosticism Services:     Active Member of Clubs or Organizations:     Attends Club or Organization Meetings:     Marital Status:    Intimate Partner Violence:     Fear of Current or Ex-Partner:     Emotionally Abused:     Physically Abused:     Sexually Abused: There were no vitals taken for this visit.      HEENT [] Hearing Loss  [] Visual Disturbance  [] Tinnitus  [] Eye pain   Respiratory [] Shortness of Breath  [] Cough  [] Snoring   Cardiovascular [] Chest Pain  [] Palpitations  [] Lightheaded   GI [] Constipation  [] Diarrhea  [] Swallowing change  [] Nausea/vomiting    [] Urinary Frequency  [] Urinary Urgency   Musculoskeletal [] Neck pain  [] Back pain  [] Muscle pain  [] Restless legs   Dermatologic [] Skin changes   Neurologic [] Memory loss/confusion  [] Seizures  [x] Trouble walking or imbalance  [x] Dizziness  [] Sleep disturbance  [] Weakness  [] Numbness  [] Tremors  [] Speech Difficulty  [] Headaches  [] Light Sensitivity  [] Sound Sensitivity   Endocrinology []Excessive thirst  []Excessive hunger   Psychiatric [] Anxiety/Depression  [] Hallucination   Allergy/immunology []Hives/environmental allergies   Hematologic/lymph [] Abnormal bleeding  [] Abnormal bruising       General appearence: Normal. Well groomed. In no acute distress    Head: Normocephalic, atraumatic  Eyes: Extraocular movements intact, eye lids normal  Lungs: Respirations unlabored, chest wall no deformity  ENT: Normal external ear canals, no sinus tenderness  Heart: Regular rate rhythm  Abdomen: No masses, tenderness  Extremities: No cyanosis or edema, 2+ pulses  Musculoskeletal: Normal range of motion in all joints  Skin: Intact, normal skin color    Neurological examination:    Mental status   Alert and oriented; intact memory with no confusion, speech or language problems; no hallucinations or delusions     Cranial nerves   II - visual fields intact to confrontation                                                III, IV, VI  extra-ocular muscles full: no pupillary defect; no DARIO, no nystagmus, no ptosis   V - normal facial sensation                                                               VII - normal facial symmetry                                                             VIII - intact hearing                                                                             IX, X - symmetrical palate                                                                  XI - symmetrical shoulder shrug                                                       XII - midline tongue without atrophy or fasciculation     Motor function  Normal muscle bulk and tone; normal power 5/5, including fine motor movements     Sensory function Unable to assess     Cerebellar Intact fine motor movement. No involuntary movements or tremors     Reflex function Intact 2+ DTR and symmetric.  Negative Babinski  Unable to assess   Gait                  Normal station and gait       Lab Results   Component Value Date    LDLCHOLESTEROL 162 (H) 04/26/2021     No components found for: CHLPL  Lab Results   Component Value Date    TRIG 143 04/26/2021     Lab Results   Component Value Date    HDL 67 04/26/2021     No results found for: LDLCALC  No results found for: LABVLDL  Lab Results   Component Value Date    LABA1C 5.7 04/27/2021     Lab Results   Component Value Date     04/27/2021     Lab Results   Component Value Date    [id number] 786 04/26/2021        All of patient's labs were personally reviewed. All the imaging studies were personally reviewed and discussed with the patient. Assessment Recommendations:  Ischemic stroke in left cerebellar hemisphere etiology unknown, April 2021  I recommend the patient complete a cardiac event monitoring test to identify if ischemic stroke was caused by Afib. I discussed with the patient the option to internal loop recorder if the event monitor is otherwise negative. For secondary stroke prophylaxis, recommend continued aspirin and Lipitor 20 mg p.o. nightly. Due to reported mild cognitive deficits, I recommend the patient take 1000 mg B12 daily. Her previous vitamin B12 levels were borderline normal.        I discussed with the patient the importance of medication compliance and monitoring cholesterol levels for stroke prevention. Right lumbar Radiculopathy  The patient appears to have right lumbar radicular pain. I will obtain MRI of the lumbar spine with and without contrast.  Patient previously has failed conservative treatment and physical therapy. Patient to follow up as needed. Patient to follow-up locally with her neurologist in [place] [address]. Checo Gamez, DO I would like to thank you for the consult. Please do not hesitate if you have any questions about the patient care. This note is created with the assistance of a speech-recognition program. While intending to generate a document that actually reflects the content of the visit, the document can still have some errors including those of syntax and sound a- like substitutions which may escape proofreading. In such instances, actual meaning can be extrapolated by contextual derivation.     Scribe Attestation:   By signing my name

## 2021-11-26 ENCOUNTER — APPOINTMENT (OUTPATIENT)
Dept: MRI IMAGING | Age: 71
DRG: 312 | End: 2021-11-26
Payer: MEDICARE

## 2021-11-26 ENCOUNTER — APPOINTMENT (OUTPATIENT)
Dept: GENERAL RADIOLOGY | Age: 71
DRG: 312 | End: 2021-11-26
Payer: MEDICARE

## 2021-11-26 ENCOUNTER — APPOINTMENT (OUTPATIENT)
Dept: CT IMAGING | Age: 71
DRG: 312 | End: 2021-11-26
Payer: MEDICARE

## 2021-11-26 ENCOUNTER — HOSPITAL ENCOUNTER (INPATIENT)
Age: 71
LOS: 1 days | Discharge: HOME | DRG: 312 | End: 2021-11-27
Attending: EMERGENCY MEDICINE | Admitting: PSYCHIATRY & NEUROLOGY
Payer: MEDICARE

## 2021-11-26 DIAGNOSIS — R55 SYNCOPE AND COLLAPSE: Primary | ICD-10-CM

## 2021-11-26 DIAGNOSIS — R29.90 STROKE-LIKE SYMPTOM: ICD-10-CM

## 2021-11-26 LAB
% CKMB: 2.2 % (ref 0–3)
ABSOLUTE EOS #: 0.31 K/UL (ref 0–0.44)
ABSOLUTE IMMATURE GRANULOCYTE: 0.11 K/UL (ref 0–0.3)
ABSOLUTE LYMPH #: 4.51 K/UL (ref 1.1–3.7)
ABSOLUTE MONO #: 0.77 K/UL (ref 0.1–1.2)
ALBUMIN SERPL-MCNC: 3.2 G/DL (ref 3.5–5.2)
ALBUMIN/GLOBULIN RATIO: 1.1 (ref 1–2.5)
ALP BLD-CCNC: 61 U/L (ref 35–104)
ALT SERPL-CCNC: 18 U/L (ref 5–33)
ANION GAP SERPL CALCULATED.3IONS-SCNC: 11 MMOL/L (ref 9–17)
AST SERPL-CCNC: 20 U/L
BASOPHILS # BLD: 1 % (ref 0–2)
BASOPHILS ABSOLUTE: 0.06 K/UL (ref 0–0.2)
BILIRUB SERPL-MCNC: 0.33 MG/DL (ref 0.3–1.2)
BILIRUBIN DIRECT: 0.08 MG/DL
BILIRUBIN, INDIRECT: 0.25 MG/DL (ref 0–1)
BNP INTERPRETATION: NORMAL
BUN BLDV-MCNC: 37 MG/DL (ref 8–23)
BUN/CREAT BLD: ABNORMAL (ref 9–20)
CALCIUM SERPL-MCNC: 8.7 MG/DL (ref 8.6–10.4)
CHLORIDE BLD-SCNC: 102 MMOL/L (ref 98–107)
CK MB: 2.6 NG/ML
CKMB INTERPRETATION: ABNORMAL
CO2: 23 MMOL/L (ref 20–31)
CREAT SERPL-MCNC: 0.79 MG/DL (ref 0.5–0.9)
DIFFERENTIAL TYPE: ABNORMAL
EOSINOPHILS RELATIVE PERCENT: 3 % (ref 1–4)
GFR AFRICAN AMERICAN: >60 ML/MIN
GFR NON-AFRICAN AMERICAN: >60 ML/MIN
GFR SERPL CREATININE-BSD FRML MDRD: ABNORMAL ML/MIN/{1.73_M2}
GFR SERPL CREATININE-BSD FRML MDRD: ABNORMAL ML/MIN/{1.73_M2}
GLOBULIN: ABNORMAL G/DL (ref 1.5–3.8)
GLUCOSE BLD-MCNC: 138 MG/DL (ref 70–99)
HCT VFR BLD CALC: 42.2 % (ref 36.3–47.1)
HEMOGLOBIN: 13.8 G/DL (ref 11.9–15.1)
IMMATURE GRANULOCYTES: 1 %
INR BLD: 0.9
LV EF: 55 %
LVEF MODALITY: NORMAL
LYMPHOCYTES # BLD: 47 % (ref 24–43)
MCH RBC QN AUTO: 28.9 PG (ref 25.2–33.5)
MCHC RBC AUTO-ENTMCNC: 32.7 G/DL (ref 28.4–34.8)
MCV RBC AUTO: 88.5 FL (ref 82.6–102.9)
MONOCYTES # BLD: 8 % (ref 3–12)
MYOGLOBIN: 61 NG/ML (ref 25–58)
NRBC AUTOMATED: 0 PER 100 WBC
PARTIAL THROMBOPLASTIN TIME: 20.4 SEC (ref 20.5–30.5)
PDW BLD-RTO: 12.5 % (ref 11.8–14.4)
PLATELET # BLD: 229 K/UL (ref 138–453)
PLATELET ESTIMATE: ABNORMAL
PMV BLD AUTO: 11.3 FL (ref 8.1–13.5)
POTASSIUM SERPL-SCNC: 4.6 MMOL/L (ref 3.7–5.3)
PRO-BNP: 84 PG/ML
PROTHROMBIN TIME: 9.8 SEC (ref 9.1–12.3)
RBC # BLD: 4.77 M/UL (ref 3.95–5.11)
RBC # BLD: ABNORMAL 10*6/UL
SEG NEUTROPHILS: 40 % (ref 36–65)
SEGMENTED NEUTROPHILS ABSOLUTE COUNT: 3.85 K/UL (ref 1.5–8.1)
SODIUM BLD-SCNC: 136 MMOL/L (ref 135–144)
TOTAL CK: 118 U/L (ref 26–192)
TOTAL PROTEIN: 6 G/DL (ref 6.4–8.3)
TROPONIN INTERP: ABNORMAL
TROPONIN INTERP: NORMAL
TROPONIN T: ABNORMAL NG/ML
TROPONIN T: NORMAL NG/ML
TROPONIN, HIGH SENSITIVITY: 11 NG/L (ref 0–14)
TROPONIN, HIGH SENSITIVITY: 9 NG/L (ref 0–14)
WBC # BLD: 9.6 K/UL (ref 3.5–11.3)
WBC # BLD: ABNORMAL 10*3/UL

## 2021-11-26 PROCEDURE — 82550 ASSAY OF CK (CPK): CPT

## 2021-11-26 PROCEDURE — 80048 BASIC METABOLIC PNL TOTAL CA: CPT

## 2021-11-26 PROCEDURE — 80076 HEPATIC FUNCTION PANEL: CPT

## 2021-11-26 PROCEDURE — 6370000000 HC RX 637 (ALT 250 FOR IP): Performed by: STUDENT IN AN ORGANIZED HEALTH CARE EDUCATION/TRAINING PROGRAM

## 2021-11-26 PROCEDURE — 85610 PROTHROMBIN TIME: CPT

## 2021-11-26 PROCEDURE — 93005 ELECTROCARDIOGRAM TRACING: CPT | Performed by: STUDENT IN AN ORGANIZED HEALTH CARE EDUCATION/TRAINING PROGRAM

## 2021-11-26 PROCEDURE — 99284 EMERGENCY DEPT VISIT MOD MDM: CPT

## 2021-11-26 PROCEDURE — 93306 TTE W/DOPPLER COMPLETE: CPT

## 2021-11-26 PROCEDURE — 82553 CREATINE MB FRACTION: CPT

## 2021-11-26 PROCEDURE — 70551 MRI BRAIN STEM W/O DYE: CPT

## 2021-11-26 PROCEDURE — 96374 THER/PROPH/DIAG INJ IV PUSH: CPT

## 2021-11-26 PROCEDURE — 6360000004 HC RX CONTRAST MEDICATION: Performed by: STUDENT IN AN ORGANIZED HEALTH CARE EDUCATION/TRAINING PROGRAM

## 2021-11-26 PROCEDURE — 1200000000 HC SEMI PRIVATE

## 2021-11-26 PROCEDURE — 94761 N-INVAS EAR/PLS OXIMETRY MLT: CPT

## 2021-11-26 PROCEDURE — 83874 ASSAY OF MYOGLOBIN: CPT

## 2021-11-26 PROCEDURE — 6360000002 HC RX W HCPCS: Performed by: EMERGENCY MEDICINE

## 2021-11-26 PROCEDURE — 6360000002 HC RX W HCPCS

## 2021-11-26 PROCEDURE — 84484 ASSAY OF TROPONIN QUANT: CPT

## 2021-11-26 PROCEDURE — 96375 TX/PRO/DX INJ NEW DRUG ADDON: CPT

## 2021-11-26 PROCEDURE — 85730 THROMBOPLASTIN TIME PARTIAL: CPT

## 2021-11-26 PROCEDURE — 71045 X-RAY EXAM CHEST 1 VIEW: CPT

## 2021-11-26 PROCEDURE — 6360000002 HC RX W HCPCS: Performed by: STUDENT IN AN ORGANIZED HEALTH CARE EDUCATION/TRAINING PROGRAM

## 2021-11-26 PROCEDURE — 99223 1ST HOSP IP/OBS HIGH 75: CPT | Performed by: PSYCHIATRY & NEUROLOGY

## 2021-11-26 PROCEDURE — 70496 CT ANGIOGRAPHY HEAD: CPT

## 2021-11-26 PROCEDURE — 72125 CT NECK SPINE W/O DYE: CPT

## 2021-11-26 PROCEDURE — 85025 COMPLETE CBC W/AUTO DIFF WBC: CPT

## 2021-11-26 PROCEDURE — 83880 ASSAY OF NATRIURETIC PEPTIDE: CPT

## 2021-11-26 PROCEDURE — 70450 CT HEAD/BRAIN W/O DYE: CPT

## 2021-11-26 RX ORDER — ONDANSETRON 2 MG/ML
4 INJECTION INTRAMUSCULAR; INTRAVENOUS ONCE
Status: COMPLETED | OUTPATIENT
Start: 2021-11-26 | End: 2021-11-26

## 2021-11-26 RX ORDER — ONDANSETRON 2 MG/ML
4 INJECTION INTRAMUSCULAR; INTRAVENOUS EVERY 6 HOURS PRN
Status: DISCONTINUED | OUTPATIENT
Start: 2021-11-26 | End: 2021-11-27 | Stop reason: HOSPADM

## 2021-11-26 RX ORDER — ONDANSETRON 4 MG/1
4 TABLET, ORALLY DISINTEGRATING ORAL EVERY 8 HOURS PRN
Status: DISCONTINUED | OUTPATIENT
Start: 2021-11-26 | End: 2021-11-27 | Stop reason: HOSPADM

## 2021-11-26 RX ORDER — ASPIRIN 81 MG/1
81 TABLET ORAL DAILY
Status: DISCONTINUED | OUTPATIENT
Start: 2021-11-26 | End: 2021-11-27 | Stop reason: HOSPADM

## 2021-11-26 RX ORDER — ONDANSETRON 2 MG/ML
INJECTION INTRAMUSCULAR; INTRAVENOUS
Status: COMPLETED
Start: 2021-11-26 | End: 2021-11-26

## 2021-11-26 RX ORDER — PROCHLORPERAZINE EDISYLATE 5 MG/ML
10 INJECTION INTRAMUSCULAR; INTRAVENOUS ONCE
Status: COMPLETED | OUTPATIENT
Start: 2021-11-26 | End: 2021-11-26

## 2021-11-26 RX ORDER — DIPHENHYDRAMINE HYDROCHLORIDE 50 MG/ML
25 INJECTION INTRAMUSCULAR; INTRAVENOUS ONCE
Status: COMPLETED | OUTPATIENT
Start: 2021-11-26 | End: 2021-11-26

## 2021-11-26 RX ORDER — ATORVASTATIN CALCIUM 80 MG/1
80 TABLET, FILM COATED ORAL NIGHTLY
Status: DISCONTINUED | OUTPATIENT
Start: 2021-11-26 | End: 2021-11-27

## 2021-11-26 RX ORDER — POLYETHYLENE GLYCOL 3350 17 G/17G
17 POWDER, FOR SOLUTION ORAL DAILY PRN
Status: DISCONTINUED | OUTPATIENT
Start: 2021-11-26 | End: 2021-11-27 | Stop reason: HOSPADM

## 2021-11-26 RX ORDER — ASPIRIN 300 MG/1
300 SUPPOSITORY RECTAL DAILY
Status: DISCONTINUED | OUTPATIENT
Start: 2021-11-26 | End: 2021-11-27 | Stop reason: HOSPADM

## 2021-11-26 RX ADMIN — ONDANSETRON 4 MG: 2 INJECTION INTRAMUSCULAR; INTRAVENOUS at 12:13

## 2021-11-26 RX ADMIN — ENOXAPARIN SODIUM 40 MG: 40 INJECTION SUBCUTANEOUS at 17:06

## 2021-11-26 RX ADMIN — ASPIRIN 81 MG: 81 TABLET, COATED ORAL at 17:06

## 2021-11-26 RX ADMIN — ATORVASTATIN CALCIUM 80 MG: 80 TABLET, FILM COATED ORAL at 21:56

## 2021-11-26 RX ADMIN — DIPHENHYDRAMINE HYDROCHLORIDE 25 MG: 50 INJECTION, SOLUTION INTRAMUSCULAR; INTRAVENOUS at 13:25

## 2021-11-26 RX ADMIN — PROCHLORPERAZINE EDISYLATE 10 MG: 5 INJECTION INTRAMUSCULAR; INTRAVENOUS at 13:25

## 2021-11-26 RX ADMIN — IOPAMIDOL 90 ML: 755 INJECTION, SOLUTION INTRAVENOUS at 12:59

## 2021-11-26 ASSESSMENT — ENCOUNTER SYMPTOMS
BACK PAIN: 0
NAUSEA: 0
SHORTNESS OF BREATH: 0
VOMITING: 0
COUGH: 0
ABDOMINAL PAIN: 0
PHOTOPHOBIA: 1

## 2021-11-26 NOTE — FLOWSHEET NOTE
[facility] [address]     Emergency/Trauma Note    PATIENT NAME: Nehemias Meraz    Shift date: 11/26/2021  Shift day: Friday   Shift # 1    Room # 06/06   Name: Nehemias Meraz            Age: 73 y.o. Gender: female          Episcopal: No Anglican on file   Place of Zoroastrian:     Trauma/Incident type: Stroke Alert  Admit Date & Time: 11/26/2021 11:41 AM  TRAUMA NAME: Same as pt. ADVANCE DIRECTIVES IN CHART? No    NAME OF DECISION MAKER: Pt.    RELATIONSHIP OF DECISION MAKER TO PATIENT: Self    PATIENT/EVENT DESCRIPTION:  Nehemias Meraz is a 73 y.o. female who arrived via ambulance from home where she lost consciousness as an ED stroke alert. Pt. Very dizzy and nauseous. Pt to be admitted to 06/06. SPIRITUAL ASSESSMENT/INTERVENTION:   offered ministry of presence for pt. And family as well as staff. Pt. Very sleepy and struggling to stay awake. Pt. Declined prayer at this time as she wishes to rest.  Other family members wait outside her room and they are very concerned. PATIENT BELONGINGS:  No belongings noted    ANY BELONGINGS OF SIGNIFICANT VALUE NOTED:  This  did not handle any belongings. REGISTRATION STAFF NOTIFIED? No      WHAT IS YOUR SPIRITUAL CARE PLAN FOR THIS PATIENT?:   Provide spiritual support for pt. And family during stay at [facility]). Electronically signed by Olinda Duvall on 11/26/2021 at [address]  [phone]     11/26/21 1352   Encounter Summary   Services provided to: Patient   Referral/Consult From: Multi-disciplinary team   Support System Spouse; Children   Continue Visiting   (11/26/2021)   Complexity of Encounter Moderate   Length of Encounter 30 minutes   Spiritual Assessment Completed Yes   Crisis   Type Stroke Alert   Assessment Calm;  Hopeful; Coping   Intervention Active listening; Explored feelings, thoughts, concerns; Explored coping resources; Nurtured hope; Sustaining presence/ Ministry of presence   Outcome Comfort; Coping

## 2021-11-26 NOTE — ED NOTES
Husband, Rocklin Shone [phone]. . please call with any questions or updates.       Sadiq Rausch RN  11/26/21 3076

## 2021-11-26 NOTE — ED NOTES
Bed: 06  Expected date:   Expected time:   Means of arrival:   Comments:  billy Frazier RN  11/26/21 1145

## 2021-11-26 NOTE — ED PROVIDER NOTES
[facility]  ED  Emergency Department Encounter  EmergencyMedicine Resident     Pt Bhumi Piña  MRN: [[id number]]  [place] 1950  Date of evaluation: 21  PCP:  Luis Silva DO    CHIEF COMPLAINT       Chief Complaint   Patient presents with    Loss of Consciousness       HISTORY OF PRESENT ILLNESS  (Location/Symptom, Timing/Onset, Context/Setting, Quality, Duration, Modifying Factors, Severity.)      Sergio Saxena is a 73 y.o. female who presents with syncope. Patient is a 68-year-old female with past medical history of cerebellar stroke last year, hypertension, nephropathy of unclear etiology, obstructive sleep apnea. Today she was eating breakfast at the table when she syncopized falling to the floor. She did hit her head on the ground. Patient states she does not remember this event or any prodrome. Family were present and she appeared blue in the face and initiated CPR but she immediately grabbed the family members arm and CPR was discontinued. Family reports that she had a very similar episode when driving last year presented with nausea, vomiting and syncope-like episode, ended up having a cerebral stroke as diagnosed on MRI. She is currently taking aspirin but no other antiplatelets or blood thinners. 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 Furthermore she is currently taken amoxicillin for a recent dental extraction. Recent cardiac echo shows EF of 60 to 65%. Patient does currently take spironolactone for hypertension. PAST MEDICAL / SURGICAL / SOCIAL / FAMILY HISTORY      has a past medical history of Hypertension, Nephropathy, and ANTIONETTE on CPAP. has a past surgical history that includes  section.       Social History     Socioeconomic History    Marital status:      Spouse name: Not on file    Number of children: Not on file    Years of education: Not on file    Highest education level: Not on file   Occupational History    Not on file   Tobacco Use    Smoking status: Never Smoker    Smokeless tobacco: Never Used   Vaping Use    Vaping Use: Never used   Substance and Sexual Activity    Alcohol use: Never    Drug use: Never    Sexual activity: Not on file   Other Topics Concern    Not on file   Social History Narrative    Not on file     Social Determinants of Health     Financial Resource Strain:     Difficulty of Paying Living Expenses: Not on file   Food Insecurity:     Worried About Running Out of Food in the Last Year: Not on file    Ramsey of Food in the Last Year: Not on file   Transportation Needs:     Lack of Transportation (Medical): Not on file    Lack of Transportation (Non-Medical): Not on file   Physical Activity:     Days of Exercise per Week: Not on file    Minutes of Exercise per Session: Not on file   Stress:     Feeling of Stress : Not on file   Social Connections:     Frequency of Communication with Friends and Family: Not on file    Frequency of Social Gatherings with Friends and Family: Not on file    Attends Sabianist Services: Not on file    Active Member of [address] [place] [address] or Organizations: Not on file    Attends Club or Organization Meetings: Not on file    Marital Status: Not on file   Intimate Partner Violence:     Fear of Current or Ex-Partner: Not on file    Emotionally Abused: Not on file    Physically Abused: Not on file    Sexually Abused: Not on file   Housing Stability:     Unable to Pay for Housing in the Last Year: Not on file    Number of [place] in the Last Year: Not on file    Unstable Housing in the Last Year: Not on file       History reviewed. No pertinent family history. Allergies:  Patient has no known allergies. Home Medications:  Prior to Admission medications    Medication Sig Start Date End Date Taking?  Authorizing Provider   furosemide (LASIX) 40 MG tablet Take 40 mg by mouth 2 times daily  5/6/21   Historical Provider, MD   aspirin 81 MG chewable tablet Take 1 tablet by mouth daily 4/30/21   FRANKY Osorio NP   albuterol sulfate (PROAIR RESPICLICK) 525 (90 Base) MCG/ACT aerosol powder inhalation Inhale 2 puffs into the lungs every 4 hours as needed for Wheezing or Shortness of Breath 4/29/21   FRANKY Osorio NP   fluticasone (FLOVENT HFA) 110 MCG/ACT inhaler Inhale 2 puffs into the lungs 2 times daily 4/29/21 4/29/22  FRANKY Osorio NP   atorvastatin (LIPITOR) 20 MG tablet Take 1 tablet by mouth nightly 4/29/21   FRANKY Osorio NP   losartan (COZAAR) 25 MG tablet Take 1 tablet by mouth 2 times daily  Patient taking differently: Take 50 mg by mouth daily  4/29/21   FRANKY Osorio NP   vitamin D (ERGOCALCIFEROL) 1.25 MG (76592 UT) CAPS capsule Take 1 capsule by mouth once a week for 9 doses  Patient taking differently: Take 50,000 Units by mouth every 30 days  5/4/21 7/21/21  FRANKY Osorio NP   clopidogrel (PLAVIX) 75 MG tablet Take 1 tablet by mouth daily for 21 days  Patient not taking: Reported on 7/21/2021 4/30/21 7/21/21  FRANKY Osorio NP       REVIEW OF SYSTEMS    (2-9 systems for level 4, 10 or more for level 5)      Review of Systems   Constitutional: Negative for chills and fever. HENT: Negative for congestion and dental problem. Eyes: Positive for photophobia. Negative for visual disturbance. Respiratory: Negative for cough and shortness of breath. Cardiovascular: Negative for chest pain. Gastrointestinal: Negative for abdominal pain, nausea and vomiting. Genitourinary: Negative for dysuria and frequency. Musculoskeletal: Negative for back pain and neck pain. Skin: Negative for rash. Neurological: Positive for dizziness and headaches. Negative for seizures and weakness.         PHYSICAL EXAM   (up to 7 for level 4, 8 or more for level 5)      INITIAL VITALS:   /71   Pulse 67   Temp 97.5 °F (36.4 °C) (Oral)   Resp 10   SpO2 96%      Vitals:    11/26/21 1247 11/26/21 1317 11/26/21 1332 11/26/21 1347   BP: 123/78 (!) 147/69 101/88 114/71   Pulse: 59 73 73 67   Resp: 12 13 11 10   Temp:       TempSrc:       SpO2: 91% 93% 93% 96%        Physical Exam  Vitals reviewed. Constitutional:       General: She is not in acute distress. Appearance: She is well-developed. She is not ill-appearing. HENT:      Head: Normocephalic and atraumatic. Eyes:      Extraocular Movements: Extraocular movements intact. Pupils: Pupils are equal, round, and reactive to light. Cardiovascular:      Rate and Rhythm: Normal rate and regular rhythm. Pulmonary:      Effort: Pulmonary effort is normal.      Breath sounds: Normal breath sounds. Abdominal:      General: There is no distension. Palpations: Abdomen is soft. Tenderness: There is no abdominal tenderness. Musculoskeletal:         General: Normal range of motion. Cervical back: Normal range of motion and neck supple. Skin:     General: Skin is warm and dry. Neurological:      General: No focal deficit present. Mental Status: She is alert and oriented to person, place, and time. DIFFERENTIAL  DIAGNOSIS     PLAN (LABS / IMAGING / EKG):  Orders Placed This Encounter   Procedures    CT HEAD WO CONTRAST    CT CERVICAL SPINE WO CONTRAST    XR CHEST PORTABLE    CTA HEAD NECK W CONTRAST    MRI BRAIN WO CONTRAST    STROKE PANEL    Troponin    Urinalysis Reflex to Culture    Brain Natriuretic Peptide    Hepatic Function Panel    CBC    Hemoglobin A1c    Lipid panel - fasting    Vital signs    Up as tolerated    Adv Diet as Tolerated (nurse communication)    NIHSS/Neuro Checks    Tobacco cessation education    Swallow screen by nursing before diet and oral medications started.     Stroke education    Telemetry monitoring - continuous duration    Full Code    Inpatient consult to Neurology   [facility] consult to Cardiology    OT eval and treat    PT evaluation and treat    Initiate Oxygen Therapy Protocol    Speech Language Pathology (SLP) eval and treat    EKG 12 Lead    ECHO Complete 2D W Doppler W Color    PATIENT STATUS (FROM ED OR OR/PROCEDURAL) Inpatient       MEDICATIONS ORDERED:  Orders Placed This Encounter   Medications    ondansetron (ZOFRAN) 4 MG/2ML injection     Klaudia Montes: cabinet override    ondansetron (ZOFRAN) injection 4 mg    prochlorperazine (COMPAZINE) injection 10 mg    diphenhydrAMINE (BENADRYL) injection 25 mg    iopamidol (ISOVUE-370) 76 % injection 90 mL    OR Linked Order Group     ondansetron (ZOFRAN-ODT) disintegrating tablet 4 mg     ondansetron (ZOFRAN) injection 4 mg    polyethylene glycol (GLYCOLAX) packet 17 g    enoxaparin (LOVENOX) injection 40 mg    OR Linked Order Group     aspirin EC tablet 81 mg     aspirin suppository 300 mg    atorvastatin (LIPITOR) tablet 80 mg       DIAGNOSTIC RESULTS / EMERGENCY DEPARTMENT COURSE / MDM   LAB RESULTS:  Results for orders placed or performed during the hospital encounter of 11/26/21   STROKE PANEL   Result Value Ref Range    Glucose 138 (H) 70 - 99 mg/dL    BUN 37 (H) 8 - 23 mg/dL    CREATININE 0.79 0.50 - 0.90 mg/dL    Bun/Cre Ratio NOT REPORTED 9 - 20    Calcium 8.7 8.6 - 10.4 mg/dL    Sodium 136 135 - 144 mmol/L    Potassium 4.6 3.7 - 5.3 mmol/L    Chloride 102 98 - 107 mmol/L    CO2 23 20 - 31 mmol/L    Anion Gap 11 9 - 17 mmol/L    GFR Non-African American >60 >60 mL/min    GFR African American >60 >60 mL/min    GFR Comment          GFR Staging NOT REPORTED     WBC 9.6 3.5 - 11.3 k/uL    RBC 4.77 3.95 - 5.11 m/uL    Hemoglobin 13.8 11.9 - 15.1 g/dL    Hematocrit 42.2 36.3 - 47.1 %    MCV 88.5 82.6 - 102.9 fL    MCH 28.9 25.2 - 33.5 pg    MCHC 32.7 28.4 - 34.8 g/dL    RDW 12.5 11.8 - 14.4 %    Platelets 035 359 - 912 k/uL    MPV 11.3 8.1 - 13.5 fL    NRBC Automated 0.0 0.0 per 100 WBC    Total  26 - 192 U/L    CK-MB 2.6 <5.4 ng/mL    % CKMB 2.2 0.0 - 3.0 %    CKMB Interpretation NORMAL ISOENZYME PATTERN     Differential Type NOT REPORTED     Seg Neutrophils 40 36 - 65 %    Lymphocytes 47 (H) 24 - 43 %    Monocytes 8 3 - 12 %    Eosinophils % 3 1 - 4 %    Basophils 1 0 - 2 %    Immature Granulocytes 1 (H) 0 %    Segs Absolute 3.85 1.50 - 8.10 k/uL    Absolute Lymph # 4.51 (H) 1.10 - 3.70 k/uL    Absolute Mono # 0.77 0.10 - 1.20 k/uL    Absolute Eos # 0.31 0.00 - 0.44 k/uL    Basophils Absolute 0.06 0.00 - 0.20 k/uL    Absolute Immature Granulocyte 0.11 0.00 - 0.30 k/uL    WBC Morphology NOT REPORTED     RBC Morphology NOT REPORTED     Platelet Estimate NOT REPORTED     Myoglobin 61 (H) 25 - 58 ng/mL    Protime 9.8 9.1 - 12.3 sec    INR 0.9     PTT 20.4 (L) 20.5 - 30.5 sec    Troponin, High Sensitivity 9 0 - 14 ng/L    Troponin T NOT REPORTED <0.03 ng/mL    Troponin Interp NOT REPORTED    Troponin   Result Value Ref Range    Troponin, High Sensitivity 11 0 - 14 ng/L    Troponin T NOT REPORTED <0.03 ng/mL    Troponin Interp NOT REPORTED    Brain Natriuretic Peptide   Result Value Ref Range    Pro-BNP 84 <300 pg/mL    BNP Interpretation NOT REPORTED    Hepatic Function Panel   Result Value Ref Range    Albumin 3.2 (L) 3.5 - 5.2 g/dL    Alkaline Phosphatase 61 35 - 104 U/L    ALT 18 5 - 33 U/L    AST 20 <32 U/L    Total Bilirubin 0.33 0.3 - 1.2 mg/dL    Bilirubin, Direct 0.08 <0.31 mg/dL    Bilirubin, Indirect 0.25 0.00 - 1.00 mg/dL    Total Protein 6.0 (L) 6.4 - 8.3 g/dL    Globulin NOT REPORTED 1.5 - 3.8 g/dL    Albumin/Globulin Ratio 1.1 1.0 - 2.5         RADIOLOGY:  MRI BRAIN WO CONTRAST   Preliminary Result   Mild cerebral atrophy. Mild chronic small vessel ischemic changes. Remote   infarcts in the cerebellar hemispheres bilaterally. No acute brain   parenchymal abnormality. CTA HEAD NECK W CONTRAST   Preliminary Result   Unremarkable CTA of the head and neck.          CT HEAD WO CONTRAST Preliminary Result   No acute intracranial abnormality. CT CERVICAL SPINE WO CONTRAST   Final Result   No acute abnormality of the cervical spine. Multilevel spondylosis, greatest at C5-C6 where there is moderate spinal   canal and right neuroforaminal stenosis. 12 mm nodule in the left thyroid lobe with no follow-up imaging recommended. RECOMMENDATIONS:   12 mm incidental thyroid nodule. No follow-up imaging is recommended. Reference: J Am Stefano Radiol. 2015 Feb;12(2): 143-50         XR CHEST PORTABLE   Final Result   Unchanged appearance of the chest without acute airspace disease identified. EKG  EKG Interpretation    Interpreted by me    Rhythm: normal sinus   Rate: normal  Axis: normal  Ectopy: none  Conduction: normal  ST Segments: no acute change  T Waves: T wave inversions in 1, aVL, V 5, 4, 6  Q Waves: none    Clinical Impression: Nonspecific EKG, sinus bradycardia, normal intervals, T wave inversion in 1, aVL, V5, V6. When compared to ECG on 4/27/2021 no acute findings. All EKG's are interpreted by the Emergency Department Physician who either signs or Co-signs this chart in the absence of a cardiologist.      [facility]:    This is a pleasant 72-year-old female coming in for syncopal episode. Patient does complain of headache, photophobia, vomited in the emergency room. Initial impression is cardiogenic syncope as she syncopized while sitting and then rapidly recovered. However family states that she had a similar presentation in the past and was diagnosed with a cerebellar stroke. Primary DDx at this point is cardiogenic syncope versus stroke. NIH 1 for chronic right lower extremity weakness. For this reason lower concern that her symptoms are due to stroke. Other differential includes electrolyte abnormality, dissection although less likely, complex migraine, infection, dehydration.   Plan for cardiac work-up, stroke work-up, stroke consult. Stroke team familiar with patient, was actually at bedside and saw patient within 5 to 10 minutes of patient being here. ED Course as of 11/26/21 1602   Fri Nov 26, 2021   1230 Patient started vomiting, Zofran given. Recent dental extraction, on amoxicillin, consider venous thrombosis as possible etiology of her headache, vomiting and strokelike symptoms. [CS]   1251 Creatinine: 0.79 [CS]   1252 XR CHEST PORTABLE  Chest x-ray unchanged. [CS]   4412 Troponin, High Sensitivity: 11 [CS]   1409 CT CERVICAL SPINE WO CONTRAST  IMPRESSION:  No acute abnormality of the cervical spine.     Multilevel spondylosis, greatest at C5-C6 where there is moderate spinal  canal and right neuroforaminal stenosis.    12 mm nodule in the left thyroid lobe with no follow-up imaging recommended.     RECOMMENDATIONS:  12 mm incidental thyroid nodule. No follow-up imaging is recommended. [CS]   9097 CT HEAD WO CONTRAST  CT head normal.  Neuro to admit [CS]   9064 Did speak with cardiology about possible cardiac nature of syncope. They will follow. No immediate recommendations [CS]      ED Course User Index  [CS] Smooth Raymundo DO         PROCEDURES:      CONSULTS:  IP CONSULT TO NEUROLOGY  IP CONSULT TO CARDIOLOGY    CRITICAL CARE:  Please see attending note    FINAL IMPRESSION      1. Syncope and collapse    2. Stroke-like symptom          DISPOSITION / PLAN     DISPOSITION Admitted 11/26/2021 02:22:52 PM      PATIENT REFERRED TO:  No follow-up provider specified.     DISCHARGE MEDICATIONS:  New Prescriptions    No medications on file       Smooth Raymundo DO  Emergency Medicine Resident    (Please note that portions of thisnote were completed with a voice recognition program.  Efforts were made to edit the dictations but occasionally words are mis-transcribed.)       Smooth Raymundo DO  Resident  11/26/21 0113

## 2021-11-26 NOTE — ED PROVIDER NOTES
[facility]  Emergency Department  Faculty Attestation     I performed a history and physical examination of the patient and discussed management with the resident. I reviewed the residents note and agree with the documented findings and plan of care. Any areas of disagreement are noted on the chart. I was personally present for the key portions of any procedures. I have documented in the chart those procedures where I was not present during the key portions. I have reviewed the emergency nurses triage note. I agree with the chief complaint, past medical history, past surgical history, allergies, medications, social and family history as documented unless otherwise noted below. For Physician Assistant/ Nurse Practitioner cases/documentation I have personally evaluated this patient and have completed at least one if not all key elements of the E/M (history, physical exam, and MDM). Additional findings are as noted. Primary Care Physician:  Munira Gonzales,     Screenings:  [unfilled]    CHIEF COMPLAINT       Chief Complaint   Patient presents with    Loss of Consciousness       RECENT VITALS:   Temp: 97.5 °F (36.4 °C),  Pulse: 59, Resp: 12, BP: (!) 145/71    LABS:  Labs Reviewed   STROKE PANEL   TROPONIN       Radiology  CT HEAD WO CONTRAST    (Results Pending)   CT CERVICAL SPINE WO CONTRAST    (Results Pending)       CRITICAL CARE: There was a high probability of clinically significant/life threatening deterioration in this patient's condition which required my urgent intervention. Total critical care time was 5 minutes. This excludes any time for separately reportable procedures. EKG:   EKG Interpretation    Interpreted by me    Rhythm: normal sinus   Rate: Tachycardia  Axis: normal  Ectopy: none  Conduction: normal  ST Segments: no acute change  T Waves:  Inversion laterally  Q Waves: High lateral    Clinical Impression: LVH with strain    Attending Physician Additional  Notes    Patient is brought for evaluation of syncope. She was sitting at the kitchen table just about ready to start to eat and had sudden onset of dizziness, probable vertigo associate with nausea and vomiting. She felt lightheaded and then passed out. Son who is a physician noted cyanosis and decreased respirations to briefly started CPR but she awoke instantly. She initially denies headache but then admits that there is a full sensation in her head. She has photophobia which is new for her. She does have sensation of vertigo. She has a history of a cerebellar stroke and had a very similar presentation. She has a recently diagnosed history of nephrotic syndrome versus glomerulonephritis. Was started on tacrolimus but had an increase in her creatinine from 0.7-1.5 and the tacrolimus was just started 2 days ago. She has no infectious complaints such as fevers chills sweats sore throat diarrhea rash. She is Covid vaccinated. She also has new medications of Aldactone. She had a recent tooth extraction and is on amoxicillin. On exam she is quiet, mildly uncomfortable, minimally hypertensive, minimally bradycardic, afebrile. GCS is 15. Normal pupils and conjugate gaze. There are several beats of right fast nystagmus that fatigues. Normal finger-nose movements bilaterally. Negative drift. Cranial nerves intact. Minimal pedal edema. Mild periorbital edema. Lungs are clear. Mouth is slightly dry. Impression is concern for central vertigo, posterior fossa stroke versus bleed, consider cardiac event, dehydration, rule out infection. Plan is neurology consultation, CT brain, CT cervical spine, CT angiogram versus MRI, laboratory studies, gentle hydration, consider migraine cocktail versus Tylenol and Zofran, cardiac monitoring, gentle hydration, dissipate admission. Eri Schulte.  Dolores Donaldson MD, [facility]  Attending Emergency  Physician               Tammi Joe, MD  11/26/21 0236

## 2021-11-26 NOTE — Clinical Note
Patient Class: Inpatient [101]   REQUIRED: Diagnosis: Syncope and collapse [780. 2. ICD-9-CM]   Estimated Length of Stay: Estimated stay of more than 2 midnights   Admitting Provider: Vita Hunter [[id number]]   Preferred Department: Neuro unit   Telemetry/Cardiac Monitoring Required?: Yes

## 2021-11-26 NOTE — ED NOTES
Pt brought in by squad for evaluation of syncope. Pt fell out of the chair onto the floor about 11am. Pt was experiencing nausea and vomiting since then. Pt has a hx of two prior caceres. Pt is on two blood thinners. Due to the past hx of stroke, physicians are evaluating that as an option. Pt is AxO but very drowsy. Pt hooked up to monitor, EKG done, labs drawn off existing IV and additional IV started for possible CT scanning.  Pt given 4mg of zofran pta,           Graciela Daily RN  11/26/21 5642

## 2021-11-27 VITALS
SYSTOLIC BLOOD PRESSURE: 139 MMHG | HEART RATE: 63 BPM | TEMPERATURE: 97.5 F | DIASTOLIC BLOOD PRESSURE: 65 MMHG | OXYGEN SATURATION: 96 % | RESPIRATION RATE: 17 BRPM

## 2021-11-27 LAB
ANION GAP SERPL CALCULATED.3IONS-SCNC: 10 MMOL/L (ref 9–17)
BUN BLDV-MCNC: 25 MG/DL (ref 8–23)
BUN/CREAT BLD: ABNORMAL (ref 9–20)
CALCIUM SERPL-MCNC: 8.6 MG/DL (ref 8.6–10.4)
CHLORIDE BLD-SCNC: 108 MMOL/L (ref 98–107)
CHOLESTEROL/HDL RATIO: 2.7
CHOLESTEROL: 171 MG/DL
CO2: 21 MMOL/L (ref 20–31)
CREAT SERPL-MCNC: 0.67 MG/DL (ref 0.5–0.9)
EKG ATRIAL RATE: 54 BPM
EKG P AXIS: 42 DEGREES
EKG P-R INTERVAL: 160 MS
EKG Q-T INTERVAL: 410 MS
EKG QRS DURATION: 94 MS
EKG QTC CALCULATION (BAZETT): 388 MS
EKG R AXIS: 9 DEGREES
EKG T AXIS: 137 DEGREES
EKG VENTRICULAR RATE: 54 BPM
GFR AFRICAN AMERICAN: >60 ML/MIN
GFR NON-AFRICAN AMERICAN: >60 ML/MIN
GFR SERPL CREATININE-BSD FRML MDRD: ABNORMAL ML/MIN/{1.73_M2}
GFR SERPL CREATININE-BSD FRML MDRD: ABNORMAL ML/MIN/{1.73_M2}
GLUCOSE BLD-MCNC: 103 MG/DL (ref 70–99)
HCT VFR BLD CALC: 40.3 % (ref 36.3–47.1)
HDLC SERPL-MCNC: 63 MG/DL
HEMOGLOBIN: 13 G/DL (ref 11.9–15.1)
LDL CHOLESTEROL: 75 MG/DL (ref 0–130)
MCH RBC QN AUTO: 29.5 PG (ref 25.2–33.5)
MCHC RBC AUTO-ENTMCNC: 32.3 G/DL (ref 28.4–34.8)
MCV RBC AUTO: 91.4 FL (ref 82.6–102.9)
NRBC AUTOMATED: 0 PER 100 WBC
PDW BLD-RTO: 12.4 % (ref 11.8–14.4)
PLATELET # BLD: 219 K/UL (ref 138–453)
PMV BLD AUTO: 11.5 FL (ref 8.1–13.5)
POTASSIUM SERPL-SCNC: 4.5 MMOL/L (ref 3.7–5.3)
RBC # BLD: 4.41 M/UL (ref 3.95–5.11)
SODIUM BLD-SCNC: 139 MMOL/L (ref 135–144)
TRIGL SERPL-MCNC: 167 MG/DL
VLDLC SERPL CALC-MCNC: ABNORMAL MG/DL (ref 1–30)
WBC # BLD: 8.1 K/UL (ref 3.5–11.3)

## 2021-11-27 PROCEDURE — 83036 HEMOGLOBIN GLYCOSYLATED A1C: CPT

## 2021-11-27 PROCEDURE — 80048 BASIC METABOLIC PNL TOTAL CA: CPT

## 2021-11-27 PROCEDURE — 99238 HOSP IP/OBS DSCHRG MGMT 30/<: CPT | Performed by: PSYCHIATRY & NEUROLOGY

## 2021-11-27 PROCEDURE — 6360000002 HC RX W HCPCS: Performed by: STUDENT IN AN ORGANIZED HEALTH CARE EDUCATION/TRAINING PROGRAM

## 2021-11-27 PROCEDURE — 85027 COMPLETE CBC AUTOMATED: CPT

## 2021-11-27 PROCEDURE — 92523 SPEECH SOUND LANG COMPREHEN: CPT

## 2021-11-27 PROCEDURE — 6370000000 HC RX 637 (ALT 250 FOR IP): Performed by: STUDENT IN AN ORGANIZED HEALTH CARE EDUCATION/TRAINING PROGRAM

## 2021-11-27 PROCEDURE — 80061 LIPID PANEL: CPT

## 2021-11-27 PROCEDURE — 93010 ELECTROCARDIOGRAM REPORT: CPT | Performed by: INTERNAL MEDICINE

## 2021-11-27 RX ORDER — AMLODIPINE BESYLATE 10 MG/1
5 TABLET ORAL DAILY
Status: DISCONTINUED | OUTPATIENT
Start: 2021-11-28 | End: 2021-11-27

## 2021-11-27 RX ORDER — LOSARTAN POTASSIUM 50 MG/1
100 TABLET ORAL DAILY
Status: DISCONTINUED | OUTPATIENT
Start: 2021-11-27 | End: 2021-11-27 | Stop reason: HOSPADM

## 2021-11-27 RX ORDER — ATORVASTATIN CALCIUM 20 MG/1
20 TABLET, FILM COATED ORAL NIGHTLY
Status: DISCONTINUED | OUTPATIENT
Start: 2021-11-27 | End: 2021-11-27 | Stop reason: HOSPADM

## 2021-11-27 RX ORDER — ATORVASTATIN CALCIUM 20 MG/1
20 TABLET, FILM COATED ORAL NIGHTLY
Status: CANCELLED | OUTPATIENT
Start: 2021-11-27

## 2021-11-27 RX ORDER — CLOPIDOGREL BISULFATE 75 MG/1
75 TABLET ORAL DAILY
Status: DISCONTINUED | OUTPATIENT
Start: 2021-11-27 | End: 2021-11-27

## 2021-11-27 RX ORDER — LOSARTAN POTASSIUM 100 MG/1
100 TABLET ORAL DAILY
Qty: 30 TABLET | Refills: 3 | Status: SHIPPED | OUTPATIENT
Start: 2021-11-28

## 2021-11-27 RX ORDER — CARVEDILOL 12.5 MG/1
6.25 TABLET ORAL 2 TIMES DAILY WITH MEALS
Status: DISCONTINUED | OUTPATIENT
Start: 2021-11-27 | End: 2021-11-27 | Stop reason: HOSPADM

## 2021-11-27 RX ORDER — FUROSEMIDE 20 MG/1
40 TABLET ORAL 2 TIMES DAILY
Status: DISCONTINUED | OUTPATIENT
Start: 2021-11-27 | End: 2021-11-27 | Stop reason: HOSPADM

## 2021-11-27 RX ADMIN — FUROSEMIDE 40 MG: 20 TABLET ORAL at 09:36

## 2021-11-27 RX ADMIN — LOSARTAN POTASSIUM 100 MG: 50 TABLET, FILM COATED ORAL at 12:36

## 2021-11-27 RX ADMIN — ASPIRIN 81 MG: 81 TABLET, COATED ORAL at 12:36

## 2021-11-27 RX ADMIN — CLOPIDOGREL BISULFATE 75 MG: 75 TABLET ORAL at 09:36

## 2021-11-27 RX ADMIN — ENOXAPARIN SODIUM 40 MG: 40 INJECTION SUBCUTANEOUS at 09:36

## 2021-11-27 ASSESSMENT — ENCOUNTER SYMPTOMS
EYE REDNESS: 0
FACIAL SWELLING: 0
COLOR CHANGE: 0
DIARRHEA: 0
VOICE CHANGE: 0
SHORTNESS OF BREATH: 0
COUGH: 0
VOMITING: 0
EYE PAIN: 0
EYE DISCHARGE: 0
CHOKING: 0
CONSTIPATION: 0
EYE ITCHING: 0
SINUS PAIN: 0
WHEEZING: 0
SINUS PRESSURE: 0
PHOTOPHOBIA: 0

## 2021-11-27 NOTE — CONSULTS
Attestation signed by      Attending Physician Statement:    I have discussed the care of  [facility] , including pertinent history and exam findings, with the Cardiology fellow/resident. I have seen and examined the patient and the key elements of all parts of the encounter have been performed by me. I agree with the assessment, plan and orders as documented by the fellow/resident, after I modified exam findings and plan of treatments, and the final version is my approved version of the assessment. Additional Comments: [facility]    Consult / H&P               Today's Date: 2021  Patient Name: [facility]  Date of admission: 2021 11:41 AM  Patient's age: 73 y.o., 1950  Admission Dx: Syncope and collapse [R55]    Reason for Consult:  Cardiac evaluation    Requesting Physician: Yamil Yanes MD    CHIEF COMPLAINT:   Syncope. History Obtained From:  patient, electronic medical record    HISTORY OF PRESENT ILLNESS:      The patient is a 73 y.o. female presenting with status post syncope. Patient has a history of recurrent syncope occurring 3 times over the last 1 year. She stated that she was sitting in her couch at home when she was suddenly passed out. No chest pain, palpitations, arrhythmias or warmth, diaphoresis or sudden position change. Patient denies any post ictal phase and states that she was unconscious for unknown period of time after which she found herself lying on the ground. States that when she came in last time she had a subacute right cerebellar ischemic stroke. Cardiology consulted for syncope. Past Medical History:   has a past medical history of Hypertension, Nephropathy, and ANTIONETTE on CPAP. Past Surgical History:   has a past surgical history that includes  section. Home Medications:    Prior to Admission medications    Medication Sig Start Date End Date Taking?  Authorizing Provider   furosemide (LASIX) 40 MG tablet Take 40 mg by mouth 2 times daily  5/6/21   Historical Provider, MD   aspirin 81 MG chewable tablet Take 1 tablet by mouth daily 4/30/21   FRANKY Braswell NP   albuterol sulfate (PROAIR RESPICLICK) 456 (90 Base) MCG/ACT aerosol powder inhalation Inhale 2 puffs into the lungs every 4 hours as needed for Wheezing or Shortness of Breath 4/29/21   FRANKY Braswell NP   fluticasone (FLOVENT HFA) 110 MCG/ACT inhaler Inhale 2 puffs into the lungs 2 times daily 4/29/21 4/29/22  FRANKY Braswell NP   atorvastatin (LIPITOR) 20 MG tablet Take 1 tablet by mouth nightly 4/29/21   FRANKY Braswell NP   losartan (COZAAR) 25 MG tablet Take 1 tablet by mouth 2 times daily  Patient taking differently: Take 50 mg by mouth daily  4/29/21   FRANKY Braswell NP   vitamin D (ERGOCALCIFEROL) 1.25 MG (27037 UT) CAPS capsule Take 1 capsule by mouth once a week for 9 doses  Patient taking differently: Take 50,000 Units by mouth every 30 days  5/4/21 7/21/21  FRANKY Braswell NP   clopidogrel (PLAVIX) 75 MG tablet Take 1 tablet by mouth daily for 21 days  Patient not taking: Reported on 7/21/2021 4/30/21 7/21/21  FRANKY Braswell NP      Current Facility-Administered Medications: atorvastatin (LIPITOR) tablet 20 mg, 20 mg, Oral, Nightly  furosemide (LASIX) tablet 40 mg, 40 mg, Oral, BID  losartan (COZAAR) tablet 100 mg, 100 mg, Oral, Daily  ondansetron (ZOFRAN-ODT) disintegrating tablet 4 mg, 4 mg, Oral, Q8H PRN **OR** ondansetron (ZOFRAN) injection 4 mg, 4 mg, IntraVENous, Q6H PRN  polyethylene glycol (GLYCOLAX) packet 17 g, 17 g, Oral, Daily PRN  enoxaparin (LOVENOX) injection 40 mg, 40 mg, SubCUTAneous, Daily  aspirin EC tablet 81 mg, 81 mg, Oral, Daily **OR** aspirin suppository 300 mg, 300 mg, Rectal, Daily    Allergies:  Patient has no known allergies. Social History:   reports that she has never smoked.  She has never used smokeless tobacco. She reports that she does not drink alcohol and does not use drugs. Family History: family history is not on file. REVIEW OF SYSTEMS:    · Constitutional: Syncope. · Eyes: No visual changes or diplopia. No scleral icterus. · ENT: No Headaches  · Cardiovascular: No cardiac history  · Respiratory: No previous pulmonary problems, No cough  · Gastrointestinal: No abdominal pain. No change in bowel or bladder habits. · Genitourinary: No dysuria, trouble voiding, or hematuria. · Musculoskeletal:  No gait disturbance, No weakness or joint complaints. · Integumentary: No rash or pruritis. · Neurological: No headache, diplopia, change in muscle strength, numbness or tingling. No change in gait, balance, coordination, mood, affect, memory, mentation, behavior. · Psychiatric: No anxiety, or depression. · Endocrine: No temperature intolerance. No excessive thirst, fluid intake, or urination. No tremor. · Hematologic/Lymphatic: No abnormal bruising or bleeding, blood clots or swollen lymph nodes. · Allergic/Immunologic: No nasal congestion or hives. PHYSICAL EXAM:      /74   Pulse 66   Temp 97.5 °F (36.4 °C) (Oral)   Resp 16   SpO2 93%    Constitutional and General Appearance: alert, cooperative, no distress and appears stated age  HEENT: PERRL, no cervical lymphadenopathy. No masses palpable. Normal oral mucosa  Respiratory:  · Normal excursion and expansion without use of accessory muscles  · Resp Auscultation: Good respiratory effort. No for increased work of breathing.  On auscultation: clear to auscultation bilaterally  Cardiovascular:  · The apical impulse is not displaced  · Heart tones are crisp and normal. regular S1 and S2.  · Jugular venous pulsation Normal  · The carotid upstroke is normal in amplitude and contour without delay or bruit  · Peripheral pulses are symmetrical and full   Abdomen:   · No masses or tenderness  · Bowel sounds present  Extremities:  ·  No Cyanosis or Clubbing  ·  Lower extremity edema: No  ·  Skin: Warm and dry  Neurological:  · Alert and oriented. · Moves all extremities well  · No abnormalities of mood, affect, memory, mentation, or behavior are noted    Labs:     CBC:   Recent Labs     11/26/21  1200 11/27/21  0546   WBC 9.6 8.1   HGB 13.8 13.0   HCT 42.2 40.3    219     BMP:   Recent Labs     11/26/21  1200 11/27/21  0546    139   K 4.6 4.5   CO2 23 21   BUN 37* 25*   CREATININE 0.79 0.67   LABGLOM >60 >60   GLUCOSE 138* 103*     BNP: No results for input(s): BNP in the last 72 hours. PT/INR:   Recent Labs     11/26/21  1200   PROTIME 9.8   INR 0.9     APTT:  Recent Labs     11/26/21  1200   APTT 20.4*     CARDIAC ENZYMES:  Recent Labs     11/26/21  1200   CKTOTAL 118   CKMB 2.6     FASTING LIPID PANEL:  Lab Results   Component Value Date    HDL 63 11/27/2021    TRIG 167 11/27/2021     LIVER PROFILE:  Recent Labs     11/26/21  1200   AST 20   ALT 18   LABALBU 3.2*     DATA:    Diagnostics:    EKG: Sinus arrhythmia. ECHO: Summary  Normal LV size and wall thickness. No obvious wall motion abnormality seen. Normal LV systolic function with LVEF >55%. Normal RV size and function. LA and RA appears normal in size. No obvious significant structural valvular abnormality noted. No significant valvular stenosis or regurgitation noted. Normal aortic root dimension. No significant pericardial effusion noted. No obvious intra-cardiac mass or shunt noted. IVC normal diameter and inspiratory variation indicating normal RA filling  pressure. IMPRESSION:    1. Status post multiple episodes of syncope and collapse at rest.  Denies any chest pain, palpitations, warmth or diaphoresis before passing out. Orthostatics showing rise in pulse assistant with POTS. Negative EKG and echo. 2.  History of right subacute cerebellar ischemic stroke in April 2021. 3.  No previous cardiac history.   4.  Previous event monitor for 2 weeks was negative for any arrhythmias as per the patient. RECOMMENDATIONS:  1. Recommend keeping under observation for now. 2. Discussed in detail with the patient her current medical condition and advised her that she will benefit from a loop recorder placement due to her relatively infrequent episodes of syncope and negative event monitor. 3. Please keep under telemetry. We will plan for possible Tilt table testing and loop recorder on Monday. 4. Hold lasix and start low dose coreg for BP control. 5. Discussed with Dr Theo Avila. Discussed with patient and Nurse.     Norris Hayward MD       Cardiovascular Fellow PGY-4  11/27/2021, 1:38 PM

## 2021-11-27 NOTE — ED NOTES
Dr Janett Renee per family request to speak to him. Family updated.        Dhaval Greene RN  11/27/21 [id number]

## 2021-11-27 NOTE — DISCHARGE SUMMARY
Physician Discharge Summary     Patient ID:  Mary Jo Patton  [de-identified]  73 y.o.  1950    Admit date: 11/26/2021    Discharge date and time: No discharge date for patient encounter. Admitting Physician: Igor Saucedo MD     Discharge Physician: Igor Saucedo MD    Admission Diagnoses: Syncope and collapse [R55]    Discharge Diagnoses: syncope, sinus arrhythmia with sinus pauses    Admission Condition: stable    Discharged Condition: fair    Indication for Admission: syncope     Genie Gore is a  73 y.o. female admitted on 11/26/2021 with Syncope and collapse [R55] She has been experiencing dizziness for several days. She was sitting at home when she felt dizzy and stoop up before losing consciousness for several minutes. Her face turned bluish. No seizure like activity. She felt confused afterwards for few minutes. No tongue bite or loss of bowel or bladder control. 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 She is on losartan 100 mg daily and lasix 40 mg BID which were both increased within the last month. Orthostatics vitals were done this morning and were negative. EKG showed sinus arrhythmia. Cardiac monitor showed missed beats. Cardiology were consulted. They saw the patient and recommended keeping the patient until Monday for loop recorder placement and keeping her on telemetry meanwhile. Cardiology fellow discussed with the patient and family and they were agreeable initially as per the cardiology fellow but they eventually decided to go home to [place], [place] and follow up with her cardiology on Monday for loop recorder placement. Lasix was held on discharge and she will follow up with her nephrologist in [facility]. Consults: cardiology    Significant Diagnostic Studies:  Echocardiogram   Normal LV size and wall thickness. No obvious wall motion abnormality seen. Normal LV systolic function with LVEF >55%. Normal RV size and function.   LA and RA appears normal in size. No obvious significant structural valvular abnormality noted. No significant valvular stenosis or regurgitation noted. Normal aortic root dimension. No significant pericardial effusion noted. No obvious intra-cardiac mass or shunt noted. IVC normal diameter and inspiratory variation indicating normal RA filling  pressure. MRI brain w/o contrast   Mild cerebral atrophy. Mild chronic small vessel ischemic changes. Remote infarcts in the cerebellar hemispheres bilaterally. No acute brain parenchymal abnormality        Discharge Exam:  NEUROLOGIC EXAMINATION  Physical Exam  Vitals and nursing note reviewed. Constitutional:       Appearance: Normal appearance. HENT:      Head: Normocephalic and atraumatic. Mouth/Throat:      Mouth: Mucous membranes are moist.   Eyes:      General: No visual field deficit. Extraocular Movements: Extraocular movements intact. Pupils: Pupils are equal, round, and reactive to light. Cardiovascular:      Rate and Rhythm: Normal rate and regular rhythm. Pulmonary:      Effort: Pulmonary effort is normal.      Breath sounds: Normal breath sounds. Abdominal:      General: Abdomen is flat. Palpations: Abdomen is soft. Musculoskeletal:      Cervical back: Normal range of motion and neck supple. Skin:     General: Skin is warm and dry. Neurological:      General: No focal deficit present. Mental Status: She is alert and oriented to person, place, and time. GCS: GCS eye subscore is 4. GCS verbal subscore is 5. GCS motor subscore is 6. Cranial Nerves: No cranial nerve deficit, dysarthria or facial asymmetry. Sensory: Sensation is intact. No sensory deficit. Motor: No weakness, tremor, atrophy, abnormal muscle tone, seizure activity or pronator drift.       Coordination: Coordination normal. Finger-Nose-Finger Test and Heel to Norma Lord Test normal.      Deep Tendon Reflexes: Babinski sign absent on the right side. Babinski sign absent on the left side. Reflex Scores:       Tricep reflexes are 2+ on the right side and 2+ on the left side. Bicep reflexes are 2+ on the right side and 2+ on the left side. Brachioradialis reflexes are 2+ on the right side and 2+ on the left side. Patellar reflexes are 2+ on the right side and 2+ on the left side. Achilles reflexes are 2+ on the right side and 2+ on the left side. .  Neurologic Exam      Mental Status   Oriented to person, place, and time. Cranial Nerves      CN III, IV, VI   Pupils are equal, round, and reactive to light.      Gait, Coordination, and Reflexes      Coordination   Finger to nose coordination: normal     Reflexes   Right brachioradialis: 2+  Left brachioradialis: 2+  Right biceps: 2+  Left biceps: 2+  Right triceps: 2+  Left triceps: 2+  Right patellar: 2+  Left patellar: 2+  Right achilles: 2+  Left achilles: 2+     Disposition: home    In process/preliminary results:  Outstanding Order Results     Date and Time Order Name Status Description    11/27/2021  5:46 AM Hemoglobin A1c In process           Patient Instructions:   Current Discharge Medication List      CONTINUE these medications which have CHANGED    Details   losartan (COZAAR) 100 MG tablet Take 1 tablet by mouth daily  Qty: 30 tablet, Refills: 3         CONTINUE these medications which have NOT CHANGED    Details   aspirin 81 MG chewable tablet Take 1 tablet by mouth daily  Qty: 30 tablet, Refills: 1      albuterol sulfate (PROAIR RESPICLICK) 594 (90 Base) MCG/ACT aerosol powder inhalation Inhale 2 puffs into the lungs every 4 hours as needed for Wheezing or Shortness of Breath  Qty: 1 Inhaler, Refills: 1      fluticasone (FLOVENT HFA) 110 MCG/ACT inhaler Inhale 2 puffs into the lungs 2 times daily  Qty: 1 Inhaler, Refills: 3      atorvastatin (LIPITOR) 20 MG tablet Take 1 tablet by mouth nightly  Qty: 30 tablet, Refills: 3      vitamin D (ERGOCALCIFEROL) 1.25 MG (84959 UT) CAPS capsule Take 1 capsule by mouth once a week for 9 doses  Qty: 9 capsule, Refills: 0      clopidogrel (PLAVIX) 75 MG tablet Take 1 tablet by mouth daily for 21 days  Qty: 21 tablet, Refills: 0         STOP taking these medications       furosemide (LASIX) 40 MG tablet Comments:   Reason for Stopping:             Activity: no lifting, Driving, or Strenuous exercise until cleared by cardiology  Diet: cardiac diet  Wound Care: none needed    Follow-up with primary cardiologist and neurologist in [place]     Signed:   Robyn Hines MD  PGY4 Neurology   11/27/2021  3:41 PM

## 2021-11-27 NOTE — PROGRESS NOTES
NEUROLOGY INPATIENT PROGRESS NOTE      11/27/2021     Briefly, Aubrey Foster is a  73 y.o. female admitted on 11/26/2021 with Syncope and collapse [R55] She has been experiencing dizziness for several days. She was sitting at home when she felt dizzy and stoop up before losing consciousness for several minutes. Her face turned bluish. No seizure like activity. She felt confused afterwards for few minutes. No tongue bite or loss of bowel or bladder control. 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 She is on losartan 100 mg daily and lasix 40 mg BID which were both increased within the last month. Orthostatics vitals were done this morning and were negative. EKG showed sinus arrhythmia. Cardiac monitor showed missed beats. Cardiology were consulted. They saw the patient and recommended keeping the patient until Monday for loop recorder placement and keeping her on telemetry meanwhile. Cardiology fellow discussed with the patient and family and they were agreeable. Subjective: feels well today. No dizziness. No current facility-administered medications on file prior to encounter.      Current Outpatient Medications on File Prior to Encounter   Medication Sig Dispense Refill    furosemide (LASIX) 40 MG tablet Take 40 mg by mouth 2 times daily       aspirin 81 MG chewable tablet Take 1 tablet by mouth daily 30 tablet 1    albuterol sulfate (PROAIR RESPICLICK) 837 (90 Base) MCG/ACT aerosol powder inhalation Inhale 2 puffs into the lungs every 4 hours as needed for Wheezing or Shortness of Breath 1 Inhaler 1    fluticasone (FLOVENT HFA) 110 MCG/ACT inhaler Inhale 2 puffs into the lungs 2 times daily 1 Inhaler 3    atorvastatin (LIPITOR) 20 MG tablet Take 1 tablet by mouth nightly 30 tablet 3    losartan (COZAAR) 25 MG tablet Take 1 tablet by mouth 2 times daily (Patient taking differently: Take 50 mg by mouth daily ) 30 tablet 1    vitamin D (ERGOCALCIFEROL) 1.25 MG (14581 UT) CAPS capsule Take 1 capsule by mouth once a week for 9 doses (Patient taking differently: Take 50,000 Units by mouth every 30 days ) 9 capsule 0    clopidogrel (PLAVIX) 75 MG tablet Take 1 tablet by mouth daily for 21 days (Patient not taking: Reported on 2021) 21 tablet 0       Allergies: Aubrie Underwood has No Known Allergies. Past Medical History:   Diagnosis Date    Hypertension     Nephropathy     ANTIONETTE on CPAP        Past Surgical History:   Procedure Laterality Date     SECTION         Medications:     enoxaparin  40 mg SubCUTAneous Daily    aspirin  81 mg Oral Daily    Or    aspirin  300 mg Rectal Daily    atorvastatin  80 mg Oral Nightly     PRN Meds include: ondansetron **OR** ondansetron, polyethylene glycol    Objective:   /74   Pulse 66   Temp 97.5 °F (36.4 °C) (Oral)   Resp 13   SpO2 92%     Blood pressure range: Systolic (89LTQ), ALEISHA:891 , Min:101 , RXP:372   ; Diastolic (20FCY), DTL:95, Min:48, Max:88      ROS:  Review of Systems   Constitutional: Negative for activity change, appetite change, chills, diaphoresis, fatigue, fever and unexpected weight change. HENT: Negative for ear discharge, ear pain, facial swelling, sinus pressure, sinus pain and voice change. Eyes: Negative for photophobia, pain, discharge, redness, itching and visual disturbance. Respiratory: Negative for cough, choking, shortness of breath and wheezing. Cardiovascular: Negative for chest pain, palpitations and leg swelling. Gastrointestinal: Negative for constipation, diarrhea and vomiting. Endocrine: Negative for polydipsia and polyuria. Musculoskeletal: Negative. Negative for neck pain and neck stiffness. Skin: Negative for color change and rash. Neurological: Positive for dizziness and syncope. Negative for tremors, seizures, facial asymmetry, speech difficulty, weakness, light-headedness, numbness and headaches.    Psychiatric/Behavioral: Negative for agitation, behavioral problems, confusion, sleep disturbance and suicidal ideas. The patient is not nervous/anxious and is not hyperactive. NEUROLOGIC EXAMINATION  Physical Exam  Vitals and nursing note reviewed. Constitutional:       Appearance: Normal appearance. HENT:      Head: Normocephalic and atraumatic. Mouth/Throat:      Mouth: Mucous membranes are moist.   Eyes:      General: No visual field deficit. Extraocular Movements: Extraocular movements intact. Pupils: Pupils are equal, round, and reactive to light. Cardiovascular:      Rate and Rhythm: Normal rate and regular rhythm. Pulmonary:      Effort: Pulmonary effort is normal.      Breath sounds: Normal breath sounds. Abdominal:      General: Abdomen is flat. Palpations: Abdomen is soft. Musculoskeletal:      Cervical back: Normal range of motion and neck supple. Skin:     General: Skin is warm and dry. Neurological:      General: No focal deficit present. Mental Status: She is alert and oriented to person, place, and time. GCS: GCS eye subscore is 4. GCS verbal subscore is 5. GCS motor subscore is 6. Cranial Nerves: No cranial nerve deficit, dysarthria or facial asymmetry. Sensory: Sensation is intact. No sensory deficit. Motor: No weakness, tremor, atrophy, abnormal muscle tone, seizure activity or pronator drift. Coordination: Coordination normal. Finger-Nose-Finger Test and Heel to [facility] Test normal.      Deep Tendon Reflexes: Babinski sign absent on the right side. Babinski sign absent on the left side. Reflex Scores:       Tricep reflexes are 2+ on the right side and 2+ on the left side. Bicep reflexes are 2+ on the right side and 2+ on the left side. Brachioradialis reflexes are 2+ on the right side and 2+ on the left side. Patellar reflexes are 2+ on the right side and 2+ on the left side.        Achilles reflexes are 2+ on the right side and 2+ on the left side. .  Neurologic Exam     Mental Status   Oriented to person, place, and time. Cranial Nerves     CN III, IV, VI   Pupils are equal, round, and reactive to light. Gait, Coordination, and Reflexes     Coordination   Finger to nose coordination: normal    Reflexes   Right brachioradialis: 2+  Left brachioradialis: 2+  Right biceps: 2+  Left biceps: 2+  Right triceps: 2+  Left triceps: 2+  Right patellar: 2+  Left patellar: 2+  Right achilles: 2+  Left achilles: 2+       Lab Results:   CBC:   Recent Labs     11/26/21  1200 11/27/21  0546   WBC 9.6 8.1   HGB 13.8 13.0    219     BMP:    Recent Labs     11/26/21  1200      K 4.6      CO2 23   BUN 37*   CREATININE 0.79   GLUCOSE 138*         Lab Results   Component Value Date    CHOL 171 11/27/2021    LDLCHOLESTEROL 75 11/27/2021    HDL 63 11/27/2021    TRIG 167 (H) 11/27/2021    ALT 18 11/26/2021    AST 20 11/26/2021    TSH 1.45 04/26/2021    INR 0.9 11/26/2021    LABA1C 5.7 04/27/2021    DOPIGDXW43 341 04/26/2021       No results found for: PHENYTOIN, PHENYTOIN, VALPROATE, CBMZ    IMAGING  Echocardiogram   Normal LV size and wall thickness. No obvious wall motion abnormality seen. Normal LV systolic function with LVEF >55%. Normal RV size and function. LA and RA appears normal in size. No obvious significant structural valvular abnormality noted. No significant valvular stenosis or regurgitation noted. Normal aortic root dimension. No significant pericardial effusion noted. No obvious intra-cardiac mass or shunt noted. IVC normal diameter and inspiratory variation indicating normal RA filling  pressure. MRI brain w/o contrast   Mild cerebral atrophy. Mild chronic small vessel ischemic changes. Remote infarcts in the cerebellar hemispheres bilaterally. No acute brain parenchymal abnormality       ASSESSMENT  1. Syncope   2. Sinus pauses   3. History of cerebellar ischemic stroke   4.  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  Pre-renal JAREK     PLAN    Continue lipitor 20 mg nightly   Continue aspirin 81 mg daily   DC plavix    Loop recorder on Monday   Continue cardiac monitoring    Resume losartan 100 mg daily    Hold lasix for now   [employer] as per cardiology    Lovenox 40 mg sc daily for DVT PPX     Tashi Dallas MD  PGY-4 Neurology Resident

## 2021-11-27 NOTE — PROGRESS NOTES
Speech Language Pathology  Facility/Department: [address] [place] [address] ED  Initial Speech/Language/Cognitive Assessment    NAME: Kriss Beltran  : 1950   MRN: [id number]  ADMISSION DATE: 2021  ADMITTING DIAGNOSIS: has Dizzy; Hypertension; Hypercholesterolemia; Osteoarthritis; Migraine without aura and without status migrainosus, not intractable; Sleep apnea; Acute CVA (cerebrovascular accident) ([facility] [address] Chronic maxillary sinusitis; Vitamin D deficiency; Membranous glomerulonephritis; Persistent proteinuria; and Syncope and collapse on their problem list.    Date of Eval: 2021   Evaluating Therapist: KRISTI Perez    RECENT RESULTS  CT OF HEAD/MRI: 21 no acute findings     Primary Complaint:71 y.o. yo female admitted on 2021. Previous hospital admission in 2021 when she presented with subacute right cerebellar ischemic stroke with electrographic evidence of old left cerebellar stroke. She is at her baseline until today when she was at home and as she tried to get up, she had an episode of syncope. She was noted to be cyanotic with decreased position. A brief CPR was started with patient quickly wake up. The family brought the patient to the ER because of the similar symptoms occurring in 2021 which at that time showed cerebellar stroke. Upon evaluation, the patient is at baseline. GCS was 15. NIH stroke scale is 0. Initial CT head, CT cervical spine and CT angiogram has been negative. The patient received Tylenol, Zofran for headache. Pain: denies       Assessment:      Diagnosis: Communication and cognition WFL. Pt follows commands and answers all questions appropriately. She is alert and oriented x4, able to recall recent events and important information as well as biographical information. She is able to identify numbers/letters, completes naming tasks independently, and is able to demonstrate adequate problem solving abilities.  No further ST services warranted. Pt in agreement and states she is at baseline function. Recommendations:  Requires SLP Intervention: No     D/C Recommendations:  (defer to medical team, no further ST)       Subjective:     General  Chart Reviewed: Yes  Patient assessed for rehabilitation services?: Yes  Family / Caregiver Present: No  Subjective  Subjective: Pt pleasant and cooperative. Endorses hx of prior strokes but states physician has told her stroke is ruled out this time. Social/Functional History  Lives With: Spouse  Vision  Vision: Within Functional Limits  Hearing  Hearing: Within functional limits          Objective:     Oral/Motor  Oral Motor: Within functional limits    Auditory Comprehension  Comprehension: Within Functional Limits    Expression  Primary Mode of Expression: Verbal    Verbal Expression  Verbal Expression: Within functional limits    Motor Speech  Motor Speech: Within Functional Limits    Pragmatics/Social Functioning  Pragmatics: Within functional limits    Cognition:      Orientation  Overall Orientation Status: Within Normal Limits  Attention  Attention: Within Functional Limits  Memory  Memory: Within Funtional Limits  Problem Solving  Problem Solving: Within Functional Limits  Numeric Reasoning  Numeric Reasoning: Within Functional Limits  Abstract Reasoning  Abstract Reasoning: Within Functional Limits  Safety/Judgement  Safety/Judgement: Within Functional Limits    Prognosis:  Individuals consulted  Consulted and agree with results and recommendations: Patient    Education:  Patient Education: Pt educated on speech, language, and cognitive evaluation.   Patient Education Response: Verbalizes understanding          Therapy Time:   Individual Concurrent Group Co-treatment   Time In 1400         Time Out 8657         Minutes 15            Timed Code Treatment Minutes: 225 [address]   Speech-Language Pathologist    11/27/2021 2:22 PM

## 2021-11-27 NOTE — ED NOTES
Speech therapist at bedside     Rolo Nance, [facility]  11/27/21 203

## 2021-11-30 LAB
ESTIMATED AVERAGE GLUCOSE: 126 MG/DL
HBA1C MFR BLD: 6 % (ref 4–6)

## 2023-05-25 NOTE — H&P
Patient instructed to follow up with his regular Primary Care Physician or Walk-in Care if his symptoms fail to improve as anticipated, worsen, or new symptoms develop.  Please proceed to the nearest hospital Emergency Room or call 911 for medical emergencies.    Additional Educational Resources:  For additional resources regarding your symptoms, diagnosis, or further health information, please visit the Health Resources section on [email or web address] or the Online Health Resources section in [employer].     of Health     Financial Resource Strain:     Difficulty of Paying Living Expenses: Not on file   Food Insecurity:     Worried About Running Out of Food in the Last Year: Not on file    Ramsey of Food in the Last Year: Not on file   Transportation Needs:     Lack of Transportation (Medical): Not on file    Lack of Transportation (Non-Medical): Not on file   Physical Activity:     Days of Exercise per Week: Not on file    Minutes of Exercise per Session: Not on file   Stress:     Feeling of Stress : Not on file   Social Connections:     Frequency of Communication with Friends and Family: Not on file    Frequency of Social Gatherings with Friends and Family: Not on file    Attends Alevism Services: Not on file    Active Member of [address] [place] [address] [employer] or Organizations: Not on file    Attends Club or Organization Meetings: Not on file    Marital Status: Not on file   Intimate Partner Violence:     Fear of Current or Ex-Partner: Not on file    Emotionally Abused: Not on file    Physically Abused: Not on file    Sexually Abused: Not on file   Housing Stability:     Unable to Pay for Housing in the Last Year: Not on file    Number of [place] in the Last Year: Not on file    Unstable Housing in the Last Year: Not on file     History reviewed. No pertinent family history.    No Known Allergies   /71   Pulse 67   Temp 97.5 °F (36.4 °C) (Oral)   Resp 10   SpO2 96%      ROS:  Constitutional Negative for fever and chills   HEENT Negative for ear discharge, ear pain, nosebleed   Eyes Negative for photophobia, pain and discharge   Respiratory Negative for hemoptysis and sputum   Cardiovascular Negative for orthopnea, claudication and PND   Gastrointestinal Negative for abdominal pain, diarrhea, blood in stool   Musculoskeletal Negative for joint pain, negative for myalgia   Skin Negative for rash or itching   Endo/heme/allergies Negative for polydipsia, environmental allergy   Psychiatric/behavioral Negative for suicidal ideation. Patient is not anxious   General examination:    Head: Normocephalic, atraumatic  Eyes: Extraocular movements intact  Lungs: Respirations unlabored, chest wall no deformity  ENT: Normal external ear canals, no sinus tenderness  Heart: Regular rate rhythm  Abdomen: No masses, tenderness  Extremities: No cyanosis or edema, 2+ pulses  Skin: Intact, normal skin color    Neurological examination:    Mental status   Alert and oriented; intact memory with no confusion, speech or language problems; no hallucinations or delusions     Cranial nerves   II - visual fields intact to confrontation                                                III, IV, VI - extra-ocular muscles full: no pupillary defect; no DARIO, no nystagmus, no ptosis                                                                      V - normal facial sensation                                                               VII - normal facial symmetry                                                             VIII - intact hearing                                                                             IX, X - symmetrical palate                                                                  XI - symmetrical shoulder shrug                                                       XII - midline tongue without atrophy or fasciculation     Motor function  Normal muscle bulk and tone; normal power 5/5, including fine motor movements     Sensory function Intact to touch, pin, vibration, proprioception     Cerebellar Intact fine motor movement. No involuntary movements or tremors     Reflex function Intact 2+ DTR and symmetric.  Negative Babinski     Gait                  Not tested         Lab Results   Component Value Date    LDLCHOLESTEROL 162 (H) 04/26/2021     No components found for: CHLPL  Lab Results   Component Value Date    TRIG 143 04/26/2021     Lab Results   Component Value Date    HDL 67 04/26/2021     No results found for: [address]  No results found for: LABVLDL  Lab Results   Component Value Date    LABA1C 5.7 04/27/2021     Lab Results   Component Value Date     04/27/2021     Lab Results   Component Value Date    UVIMYLRZ32 286 04/26/2021      Neurological work up:  CT head 4/25/2021 unremarkable  CTA head and neck 4/25/2021 unremarkable  MRI brain     2 D echo      Assessment and Recommendations:   Syncope, possibly orthostatic   Patient with a history of left cerebellar ischemic stroke April 2021 and radiographic evidence of old right cerebellar ischemic stroke  Stroke risk factors include hypertension and age    I will admit the patient to neurology service, continue aspirin 81 mg a day, add on Plavix 75 mg a day and continue Lipitor for secondary stroke prophylaxis. 2D echo  MRI brain has been personally reviewed. Official report is pending. There is evidence of old bilateral cerebellar stroke but no acute stroke. Cardiac consult for syncope  PT OT evaluation  Anticipate discharge home tomorrow once the work-up is complete. Updated the family and questions were answered. Boubacar Neri MD  Neurology    This note is created with the assistance of a speech-recognition program. While intending to generate a document that actually reflects the content of the visit, the document can still have some errors including those of syntax and sound a- like substitutions which may escape proofreading. In such instances, actual meaning can be extrapolated by contextual derivation.